# Patient Record
Sex: FEMALE | Race: OTHER | NOT HISPANIC OR LATINO | URBAN - METROPOLITAN AREA
[De-identification: names, ages, dates, MRNs, and addresses within clinical notes are randomized per-mention and may not be internally consistent; named-entity substitution may affect disease eponyms.]

---

## 2022-12-06 ENCOUNTER — EMERGENCY (EMERGENCY)
Facility: HOSPITAL | Age: 32
LOS: 1 days | Discharge: HOME | End: 2022-12-06
Attending: EMERGENCY MEDICINE | Admitting: EMERGENCY MEDICINE
Payer: MEDICAID

## 2022-12-06 VITALS — OXYGEN SATURATION: 100 % | HEIGHT: 64 IN | WEIGHT: 195.11 LBS

## 2022-12-06 PROCEDURE — 99053 MED SERV 10PM-8AM 24 HR FAC: CPT

## 2022-12-06 PROCEDURE — 99283 EMERGENCY DEPT VISIT LOW MDM: CPT | Mod: 25

## 2022-12-06 PROCEDURE — 12002 RPR S/N/AX/GEN/TRNK2.6-7.5CM: CPT

## 2022-12-06 RX ORDER — ACETAMINOPHEN 500 MG
650 TABLET ORAL ONCE
Refills: 0 | Status: COMPLETED | OUTPATIENT
Start: 2022-12-06 | End: 2022-12-06

## 2022-12-06 NOTE — ED ADULT TRIAGE NOTE - CHIEF COMPLAINT QUOTE
I got a cut on my left hand when the glass door broke as I was knocking hard on the door. OI had Tetanus shot within 5 years"

## 2022-12-06 NOTE — ED ADULT NURSE NOTE - OBJECTIVE STATEMENT
Pt is alert, came to the ER due to laceration to the left palm due to glass door that broke as she was knocking hard . Up to date with Tetanus shot.

## 2022-12-07 VITALS
RESPIRATION RATE: 19 BRPM | DIASTOLIC BLOOD PRESSURE: 71 MMHG | OXYGEN SATURATION: 100 % | SYSTOLIC BLOOD PRESSURE: 123 MMHG | HEART RATE: 89 BPM | TEMPERATURE: 98 F

## 2022-12-07 PROCEDURE — 99283 EMERGENCY DEPT VISIT LOW MDM: CPT

## 2022-12-07 PROCEDURE — 73130 X-RAY EXAM OF HAND: CPT | Mod: 26,LT

## 2022-12-07 PROCEDURE — 73130 X-RAY EXAM OF HAND: CPT

## 2022-12-07 PROCEDURE — 12002 RPR S/N/AX/GEN/TRNK2.6-7.5CM: CPT

## 2022-12-07 RX ADMIN — Medication 650 MILLIGRAM(S): at 00:16

## 2022-12-07 NOTE — ED PROVIDER NOTE - NSFOLLOWUPINSTRUCTIONS_ED_ALL_ED_FT

## 2022-12-07 NOTE — ED PROVIDER NOTE - CLINICAL SUMMARY MEDICAL DECISION MAKING FREE TEXT BOX
pt p/w cut on thumb of left hand s/o punching on glass door, no FB , repaired the wound and advise local wound care

## 2022-12-07 NOTE — ED PROVIDER NOTE - OBJECTIVE STATEMENT
32-year-old female no significant past medical history presents complaining cut on her left hand near the base of the thumb s/p she was hitting a glass on a door and she hit hard, the glass broke into pieces, complains of mild bleeding

## 2022-12-07 NOTE — ED PROVIDER NOTE - PHYSICAL EXAMINATION
General:     NAD, well-nourished, well-appearing  Head:     NC/AT, EOMI, oral mucosa moist  Neck:     supple  Lungs:     CTA b/l, no w/r/r  CVS:     S1S2, RRR, no m/g/r  Abd:     +BS, s/nt/nd, no organomegaly  Ext:    2+ radial and pedal pulses, no c/c/e  Neuro: grossly intact  skin : superficial lac on thenar eminence , distal NV intact

## 2024-09-24 ENCOUNTER — EMERGENCY (EMERGENCY)
Facility: HOSPITAL | Age: 34
LOS: 1 days | Discharge: HOME | End: 2024-09-24
Attending: STUDENT IN AN ORGANIZED HEALTH CARE EDUCATION/TRAINING PROGRAM
Payer: MEDICAID

## 2024-09-24 VITALS
OXYGEN SATURATION: 94 % | RESPIRATION RATE: 16 BRPM | SYSTOLIC BLOOD PRESSURE: 100 MMHG | HEART RATE: 84 BPM | TEMPERATURE: 99 F | HEIGHT: 64 IN | WEIGHT: 169.98 LBS | DIASTOLIC BLOOD PRESSURE: 70 MMHG

## 2024-09-24 NOTE — ED ADULT TRIAGE NOTE - CHIEF COMPLAINT QUOTE
tooth pain, foot swelling, also had sex where condom broke and had 1 episode of resolved vaginal bleeding, but noticed a genital rash

## 2024-09-25 VITALS
TEMPERATURE: 98 F | RESPIRATION RATE: 18 BRPM | OXYGEN SATURATION: 100 % | SYSTOLIC BLOOD PRESSURE: 97 MMHG | DIASTOLIC BLOOD PRESSURE: 65 MMHG | HEART RATE: 62 BPM

## 2024-09-25 LAB
ALBUMIN SERPL ELPH-MCNC: 4.6 G/DL — SIGNIFICANT CHANGE UP (ref 3.3–5)
ALP SERPL-CCNC: 65 U/L — SIGNIFICANT CHANGE UP (ref 40–120)
ALT FLD-CCNC: 13 U/L — SIGNIFICANT CHANGE UP (ref 10–45)
ANION GAP SERPL CALC-SCNC: 13 MMOL/L — SIGNIFICANT CHANGE UP (ref 5–17)
APPEARANCE UR: ABNORMAL
AST SERPL-CCNC: 16 U/L — SIGNIFICANT CHANGE UP (ref 10–40)
BACTERIA # UR AUTO: ABNORMAL /HPF
BASOPHILS # BLD AUTO: 0.03 K/UL — SIGNIFICANT CHANGE UP (ref 0–0.2)
BASOPHILS NFR BLD AUTO: 0.6 % — SIGNIFICANT CHANGE UP (ref 0–2)
BILIRUB SERPL-MCNC: 0.2 MG/DL — SIGNIFICANT CHANGE UP (ref 0.2–1.2)
BILIRUB UR-MCNC: NEGATIVE — SIGNIFICANT CHANGE UP
BUN SERPL-MCNC: 10 MG/DL — SIGNIFICANT CHANGE UP (ref 7–23)
CALCIUM SERPL-MCNC: 10.6 MG/DL — HIGH (ref 8.4–10.5)
CAST: 0 /LPF — SIGNIFICANT CHANGE UP (ref 0–4)
CHLORIDE SERPL-SCNC: 100 MMOL/L — SIGNIFICANT CHANGE UP (ref 96–108)
CO2 SERPL-SCNC: 26 MMOL/L — SIGNIFICANT CHANGE UP (ref 22–31)
COLOR SPEC: YELLOW — SIGNIFICANT CHANGE UP
CREAT SERPL-MCNC: 0.87 MG/DL — SIGNIFICANT CHANGE UP (ref 0.5–1.3)
DIFF PNL FLD: NEGATIVE — SIGNIFICANT CHANGE UP
EGFR: 89 ML/MIN/1.73M2 — SIGNIFICANT CHANGE UP
EOSINOPHIL # BLD AUTO: 0.17 K/UL — SIGNIFICANT CHANGE UP (ref 0–0.5)
EOSINOPHIL NFR BLD AUTO: 3.1 % — SIGNIFICANT CHANGE UP (ref 0–6)
GLUCOSE SERPL-MCNC: 81 MG/DL — SIGNIFICANT CHANGE UP (ref 70–99)
GLUCOSE UR QL: NEGATIVE MG/DL — SIGNIFICANT CHANGE UP
HCG SERPL-ACNC: <2 MIU/ML — SIGNIFICANT CHANGE UP
HCT VFR BLD CALC: 45.7 % — HIGH (ref 34.5–45)
HCV AB S/CO SERPL IA: 0.04 S/CO — SIGNIFICANT CHANGE UP
HCV AB SERPL-IMP: SIGNIFICANT CHANGE UP
HGB BLD-MCNC: 14.8 G/DL — SIGNIFICANT CHANGE UP (ref 11.5–15.5)
HIV 1 & 2 AB SERPL IA.RAPID: SIGNIFICANT CHANGE UP
HIV 1+2 AB+HIV1 P24 AG SERPL QL IA: SIGNIFICANT CHANGE UP
IMM GRANULOCYTES NFR BLD AUTO: 0.2 % — SIGNIFICANT CHANGE UP (ref 0–0.9)
KETONES UR-MCNC: NEGATIVE MG/DL — SIGNIFICANT CHANGE UP
LEUKOCYTE ESTERASE UR-ACNC: ABNORMAL
LYMPHOCYTES # BLD AUTO: 1.67 K/UL — SIGNIFICANT CHANGE UP (ref 1–3.3)
LYMPHOCYTES # BLD AUTO: 30.9 % — SIGNIFICANT CHANGE UP (ref 13–44)
MCHC RBC-ENTMCNC: 30.7 PG — SIGNIFICANT CHANGE UP (ref 27–34)
MCHC RBC-ENTMCNC: 32.4 GM/DL — SIGNIFICANT CHANGE UP (ref 32–36)
MCV RBC AUTO: 94.8 FL — SIGNIFICANT CHANGE UP (ref 80–100)
MONOCYTES # BLD AUTO: 0.37 K/UL — SIGNIFICANT CHANGE UP (ref 0–0.9)
MONOCYTES NFR BLD AUTO: 6.9 % — SIGNIFICANT CHANGE UP (ref 2–14)
NEUTROPHILS # BLD AUTO: 3.15 K/UL — SIGNIFICANT CHANGE UP (ref 1.8–7.4)
NEUTROPHILS NFR BLD AUTO: 58.3 % — SIGNIFICANT CHANGE UP (ref 43–77)
NITRITE UR-MCNC: POSITIVE
NRBC # BLD: 0 /100 WBCS — SIGNIFICANT CHANGE UP (ref 0–0)
PH UR: 5.5 — SIGNIFICANT CHANGE UP (ref 5–8)
PLATELET # BLD AUTO: 231 K/UL — SIGNIFICANT CHANGE UP (ref 150–400)
POTASSIUM SERPL-MCNC: 3.9 MMOL/L — SIGNIFICANT CHANGE UP (ref 3.5–5.3)
POTASSIUM SERPL-SCNC: 3.9 MMOL/L — SIGNIFICANT CHANGE UP (ref 3.5–5.3)
PROT SERPL-MCNC: 7.8 G/DL — SIGNIFICANT CHANGE UP (ref 6–8.3)
PROT UR-MCNC: NEGATIVE MG/DL — SIGNIFICANT CHANGE UP
RBC # BLD: 4.82 M/UL — SIGNIFICANT CHANGE UP (ref 3.8–5.2)
RBC # FLD: 11.4 % — SIGNIFICANT CHANGE UP (ref 10.3–14.5)
RBC CASTS # UR COMP ASSIST: 0 /HPF — SIGNIFICANT CHANGE UP (ref 0–4)
REVIEW: SIGNIFICANT CHANGE UP
SODIUM SERPL-SCNC: 139 MMOL/L — SIGNIFICANT CHANGE UP (ref 135–145)
SP GR SPEC: 1.01 — SIGNIFICANT CHANGE UP (ref 1–1.03)
SQUAMOUS # UR AUTO: 4 /HPF — SIGNIFICANT CHANGE UP (ref 0–5)
UROBILINOGEN FLD QL: 1 MG/DL — SIGNIFICANT CHANGE UP (ref 0.2–1)
WBC # BLD: 5.4 K/UL — SIGNIFICANT CHANGE UP (ref 3.8–10.5)
WBC # FLD AUTO: 5.4 K/UL — SIGNIFICANT CHANGE UP (ref 3.8–10.5)
WBC UR QL: 33 /HPF — HIGH (ref 0–5)

## 2024-09-25 RX ORDER — ULIPRISTAL ACETATE 30 MG/1
30 TABLET ORAL ONCE
Refills: 0 | Status: COMPLETED | OUTPATIENT
Start: 2024-09-25 | End: 2024-09-25

## 2024-09-25 RX ORDER — LEVONORGESTREL 1.5 MG/1
1.5 TABLET ORAL ONCE
Refills: 0 | Status: DISCONTINUED | OUTPATIENT
Start: 2024-09-25 | End: 2024-09-25

## 2024-09-25 RX ORDER — NITROFURANTOIN MONOHYD/M-CRYST 100 MG
1 CAPSULE ORAL
Qty: 14 | Refills: 0
Start: 2024-09-25 | End: 2024-10-01

## 2024-09-25 RX ORDER — NITROFURANTOIN MONOHYD/M-CRYST 100 MG
1 CAPSULE ORAL
Qty: 14 | Refills: 0
Start: 2024-09-25 | End: 2024-10-02

## 2024-09-25 RX ORDER — CLINDAMYCIN PHOSPHATE 20 MG/G
1 CREAM VAGINAL
Qty: 1 | Refills: 1
Start: 2024-09-25

## 2024-09-25 RX ADMIN — Medication 100 MILLIGRAM(S): at 04:51

## 2024-09-25 RX ADMIN — ULIPRISTAL ACETATE 30 MILLIGRAM(S): 30 TABLET ORAL at 05:29

## 2024-09-25 NOTE — ED PROVIDER NOTE - IV ALTEPLASE EXCL ABS HIDDEN
Patient states that Dr Kuldeep Silva is her OB/GYN and requested that patient have Dr Young Alanis lower the dosage of the Levothyroxine from 175 mcg tablet to 150 mcg tablet with a 90 day supply  Patient is pregnant  Please advise patient when order is sent in to the pharmacy  Please review chart for results of lab work  show

## 2024-09-25 NOTE — ED PROVIDER NOTE - PHYSICAL EXAMINATION
GENERAL: Awake, alert, NAD  HEAD: NC/AT, neck supple, moist mucous membranes, poor dentition with multiple caries noted; broken tooth on left lower jaw with exposed nerve root, no obvious abscess   EYES: PERRL, EOM grossly intact, sclera anicteric  LUNGS: normal WOB on RA, CTAB, no wheezes or crackles   CARDIAC: RRR, no m/r/g  ABDOMEN: Soft, mildly tender over bilateral lower quadrants and suprapubic region, non distended, no rebound, no guarding  BACK: no CVA tenderness  EXT: No edema, no calf tenderness, no deformities.  NEURO: A&Ox3. Moving all extremities without focal deficit.  SKIN: Warm and dry. No rash. Note scattered bumps/irritation c/w acne to bilateral glutes  PSYCH: Normal affect.

## 2024-09-25 NOTE — ED ADULT NURSE NOTE - OBJECTIVE STATEMENT
Break coverage RN. PT is a 34yo F coming from home c/o multiple medical complaints. As per pt, had unprotected vaginal intercourse last night and now would like a plan B, also had a polyp removed in the past and has been having vaginal bleeding and spotting over the past couple of days. PT also states that she used to be on acne medication for a rash on her buttocks, but is now unable to afford medicine. PT also endorses wanted STD/STI testing and had an abscess over tooth that recently burst, but needs abx. No pertinent PMH. PT A,Ox4, ambulatory at baseline. Respirations even and unlabored, abd soft, nondistended and nontender, skin warm, dry and intact, CRUZ. Denies HA, CP, SOB, n/v/d, chills. Stretcher locked in lowest position, appropriate side rails up for safety, pt instructed to call for RN if anything needed.

## 2024-09-25 NOTE — ED PROVIDER NOTE - NSFOLLOWUPCLINICS_GEN_ALL_ED_FT
[facility]  OB/GYN & Surg  [address] [place] [address]  Phone: [phone]  Fax:     [facility] - [place]  Dermatology  [address], [address]  [place] [address]  Phone: [phone]  Fax: [phone]

## 2024-09-25 NOTE — ED PROVIDER NOTE - ATTENDING CONTRIBUTION TO CARE
33 female here with multiple medical complaints of abscess that she popped by left lower tooth that broke, cloudy urine with dysuria and right gluteal acne.  Never had STI in the past.  LMP first week of September.  Denies nausea, vomiting.  Denies chest pain, shortness of breath.  Had fever yesterday Tmax 100.1.  Took Motrin with improvement.  History complicated by Suboxone use.  Patient states she has not had sexual intercourse in "hot" but triage note states that she had recent sexual intercourse with a broken condom.    Hemodynamically stable. NAD. AAOx4 (person, place, time, event). PERRL 3mm, EOMI w/o nystagmus, well hydrated, poor dentition with fillings, missing teeth in the left lower molar, broken tooth with exposed nerve root left lower quadrant of mouth without gum redness, no fluctuant region visible.  No neck crepitations.  RRR, no audible cardiac murmurs. clear lungs, no inc work of breathing. benign abdomen except for mild suprapubic and left lower quadrant abdominal tenderness palpation., - lange, no peritoneal signs, no cva ttp. no visible rashes nor deformities, no signs of jaundice, fluid overload, nor anemia. symm calves, no edema. full str/rom/neurovasc all 4 extrem. Steady gait.    History exam without evidence of deep space oropharyngeal infection.  Patient with fluent speech.  Has been tolerating soup.  Do not suspect cervicitis as patient without foul vaginal discharge.  Will evaluate for urinary tract infection.  With blood streak vaginal discharge, patient states she had a polyp in the past that needed surgical removal by OB/GYN.  Will evaluate further with transvaginal ultrasound.  Patient accepts STI testing.  Obtain labs.  Anticipate prescribing clindamycin gel for right gluteal acne/folliculitis.

## 2024-09-25 NOTE — ED PROVIDER NOTE - PATIENT PORTAL LINK FT
You can access the FollowMyHealth Patient Portal offered by [facility] by registering at the following website: [email or web address]. By joining [employer]’s FollowMyHealth portal, you will also be able to view your health information using other applications (apps) compatible with our system.

## 2024-09-25 NOTE — ED PROVIDER NOTE - CARE PLAN
1 Principal Discharge DX:	Pain, dental  Secondary Diagnosis:	Encounter for medical screening examination

## 2024-09-25 NOTE — ED PROVIDER NOTE - OBJECTIVE STATEMENT
33Y F here with multiple complaints including dental pain and vaginal bleeding. Patient reports that she broke a tooth several days ago and is experiencing pain to a tooth on L lower side of mouth. 33Y F here with multiple complaints including dental pain and vaginal bleeding. Patient reports that she broke a tooth several days ago and is experiencing pain to a tooth on L lower side of mouth, concerned she may have an abscess. Also c/o vaginal spotting for last several days, notes that her period is not due for another week (reports LMP 9/1). Denies vaginal discharge. States that she has not been sexually active for a while but is requesting STI testing today. Feels that her urine has been cloudy and is concerned for UTI, would like to be checked for this as well. Patient also c/o acne on her bottom and wonders if we can give her cream for this. Had a fever to 100.1F last night, took motrin with some relief.

## 2024-09-25 NOTE — ED PROVIDER NOTE - PROGRESS NOTE DETAILS
Attending Beni Hatfield:  Patient stated to nurse she was sexually active, insurance does not go into affect yet. Asking for plan B. Jeaneth Pitt MD PGY-3: US with 2cm hemorrhagic ovarian cyst. UA with evidence of UTI, ceftriaxone given. labs nonactionable, HIV and Hep C screening negative. will dc with Macrobid for UTI. patient has dental f/u appt today that she plans to keep. will give GYN referral, PMD referral for outpatient follow up. patient expressed understanding of return precautions and f/u plan and will be dc home in stable condition.

## 2024-09-25 NOTE — ED PROVIDER NOTE - NSFOLLOWUPINSTRUCTIONS_ED_ALL_ED_FT
You were seen in the ER today for dental pain and vaginal bleeding.    Your urine testing showed evidence of a urinary tract infection. You were treated with an antibiotic called ceftriaxone through the IV. I prescribed an antibiotic called nitrofurantoin (Macrobid) which should be taken twice per day for the next 7 days.    Your ultrasound showed a cyst on your ovary. You should follow up with an OBGYN doctor for repeat imaging and further evaluation of this cyst. I placed a referral; someone will call you to help you schedule an appointment.     The results of all of the testing that was performed today are included in this paperwork.     Return to the ER if you develop new or worsening pain, fevers, dizziness or lightheadedness, heavy vaginal bleeding (soaking through >1 pad per hour) or if you are otherwise concerned, return to the ER to be re-evaluated.

## 2024-09-26 LAB
C TRACH RRNA SPEC QL NAA+PROBE: DETECTED
N GONORRHOEA RRNA SPEC QL NAA+PROBE: SIGNIFICANT CHANGE UP

## 2024-09-26 NOTE — ED POST DISCHARGE NOTE - ADDITIONAL DOCUMENTATION
Postpartum Note:    Patient without complaints, ambulating and voiding without difficulty  Vitals with min/max:      Vital Last Value 24 Hour Range   Temperature 98.3 °F (36.8 °C) (10/07/20 0740) Temp  Min: 98.1 °F (36.7 °C)  Max: 98.8 °F (37.1 °C)   Pulse 60 (10/07/20 0740) Pulse  Min: 59  Max: 86   Respiratory 16 (10/07/20 0740) Resp  Min: 16  Max: 16   Non-Invasive  Blood Pressure 100/80 (10/07/20 0740) BP  Min: 100/80  Max: 121/66   Pulse Oximetry   No data recorded   Arterial   Blood Pressure   No data recorded      Lochia as expected,     Heart-regular rate & rhythm     Lungs-CTA bilaterally    Abdomen-soft, nondistended, +bowel sounds, normoactive    Extremeties-no clubbing cyanosis or edema.     WBC (K/mcL)   Date Value   10/07/2020 11.2 (H)     RBC (mil/mcL)   Date Value   10/07/2020 3.85 (L)     HCT (%)   Date Value   10/07/2020 34.2 (L)     HGB (g/dL)   Date Value   10/07/2020 10.9 (L)     PLT (K/mcL)   Date Value   10/07/2020 203      Progressing as expected S/P       Postpartum Day #  1-S/P   Continue Routine Post-Partum Care and Teaching  Routine Follow-Up  D/C TO HOME TOMORROW   9/27/24 Faraz NUNEZ pt called stating pharmacy did not receive Doxycycline. rx resubmitted to pharmacy.

## 2024-09-27 RX ORDER — DOXYCYCLINE MONOHYDRATE 100 MG
1 TABLET ORAL
Qty: 14 | Refills: 0
Start: 2024-09-27 | End: 2024-10-03

## 2024-09-28 LAB
-  AMOXICILLIN/CLAVULANIC ACID: SIGNIFICANT CHANGE UP
-  AMPICILLIN/SULBACTAM: SIGNIFICANT CHANGE UP
-  AMPICILLIN: SIGNIFICANT CHANGE UP
-  AZTREONAM: SIGNIFICANT CHANGE UP
-  CEFAZOLIN: SIGNIFICANT CHANGE UP
-  CEFEPIME: SIGNIFICANT CHANGE UP
-  CEFOXITIN: SIGNIFICANT CHANGE UP
-  CEFTRIAXONE: SIGNIFICANT CHANGE UP
-  CEFUROXIME: SIGNIFICANT CHANGE UP
-  CIPROFLOXACIN: SIGNIFICANT CHANGE UP
-  ERTAPENEM: SIGNIFICANT CHANGE UP
-  GENTAMICIN: SIGNIFICANT CHANGE UP
-  IMIPENEM: SIGNIFICANT CHANGE UP
-  LEVOFLOXACIN: SIGNIFICANT CHANGE UP
-  MEROPENEM: SIGNIFICANT CHANGE UP
-  NITROFURANTOIN: SIGNIFICANT CHANGE UP
-  PIPERACILLIN/TAZOBACTAM: SIGNIFICANT CHANGE UP
-  TOBRAMYCIN: SIGNIFICANT CHANGE UP
-  TRIMETHOPRIM/SULFAMETHOXAZOLE: SIGNIFICANT CHANGE UP
CULTURE RESULTS: ABNORMAL
METHOD TYPE: SIGNIFICANT CHANGE UP
ORGANISM # SPEC MICROSCOPIC CNT: ABNORMAL
ORGANISM # SPEC MICROSCOPIC CNT: ABNORMAL
SPECIMEN SOURCE: SIGNIFICANT CHANGE UP

## 2024-09-30 ENCOUNTER — EMERGENCY (EMERGENCY)
Facility: HOSPITAL | Age: 34
LOS: 1 days | Discharge: HOME | End: 2024-09-30
Attending: EMERGENCY MEDICINE
Payer: MEDICAID

## 2024-09-30 VITALS
SYSTOLIC BLOOD PRESSURE: 101 MMHG | RESPIRATION RATE: 20 BRPM | DIASTOLIC BLOOD PRESSURE: 84 MMHG | WEIGHT: 175.05 LBS | TEMPERATURE: 99 F | HEART RATE: 89 BPM | HEIGHT: 64 IN | OXYGEN SATURATION: 95 %

## 2024-09-30 VITALS
HEART RATE: 71 BPM | RESPIRATION RATE: 16 BRPM | SYSTOLIC BLOOD PRESSURE: 111 MMHG | TEMPERATURE: 99 F | DIASTOLIC BLOOD PRESSURE: 77 MMHG | OXYGEN SATURATION: 100 %

## 2024-09-30 PROBLEM — Z78.9 OTHER SPECIFIED HEALTH STATUS: Chronic | Status: ACTIVE | Noted: 2024-09-25

## 2024-09-30 LAB
ADD ON TEST-SPECIMEN IN LAB: SIGNIFICANT CHANGE UP
ALBUMIN SERPL ELPH-MCNC: 4.6 G/DL — SIGNIFICANT CHANGE UP (ref 3.3–5)
ALP SERPL-CCNC: 65 U/L — SIGNIFICANT CHANGE UP (ref 40–120)
ALT FLD-CCNC: 16 U/L — SIGNIFICANT CHANGE UP (ref 10–45)
ANION GAP SERPL CALC-SCNC: 14 MMOL/L — SIGNIFICANT CHANGE UP (ref 5–17)
APPEARANCE UR: CLEAR — SIGNIFICANT CHANGE UP
AST SERPL-CCNC: 17 U/L — SIGNIFICANT CHANGE UP (ref 10–40)
BACTERIA # UR AUTO: ABNORMAL /HPF
BASOPHILS # BLD AUTO: 0.04 K/UL — SIGNIFICANT CHANGE UP (ref 0–0.2)
BASOPHILS NFR BLD AUTO: 0.5 % — SIGNIFICANT CHANGE UP (ref 0–2)
BILIRUB SERPL-MCNC: 0.5 MG/DL — SIGNIFICANT CHANGE UP (ref 0.2–1.2)
BILIRUB UR-MCNC: NEGATIVE — SIGNIFICANT CHANGE UP
BUN SERPL-MCNC: 12 MG/DL — SIGNIFICANT CHANGE UP (ref 7–23)
CALCIUM SERPL-MCNC: 9.8 MG/DL — SIGNIFICANT CHANGE UP (ref 8.4–10.5)
CAST: 0 /LPF — SIGNIFICANT CHANGE UP (ref 0–4)
CHLORIDE SERPL-SCNC: 103 MMOL/L — SIGNIFICANT CHANGE UP (ref 96–108)
CO2 SERPL-SCNC: 23 MMOL/L — SIGNIFICANT CHANGE UP (ref 22–31)
COLOR SPEC: YELLOW — SIGNIFICANT CHANGE UP
CREAT SERPL-MCNC: 0.81 MG/DL — SIGNIFICANT CHANGE UP (ref 0.5–1.3)
CRP SERPL-MCNC: 3 MG/L — SIGNIFICANT CHANGE UP (ref 0–4)
DIFF PNL FLD: NEGATIVE — SIGNIFICANT CHANGE UP
EGFR: 97 ML/MIN/1.73M2 — SIGNIFICANT CHANGE UP
EOSINOPHIL # BLD AUTO: 0.09 K/UL — SIGNIFICANT CHANGE UP (ref 0–0.5)
EOSINOPHIL NFR BLD AUTO: 1.1 % — SIGNIFICANT CHANGE UP (ref 0–6)
ERYTHROCYTE [SEDIMENTATION RATE] IN BLOOD: 34 MM/HR — HIGH (ref 0–15)
GLUCOSE SERPL-MCNC: 109 MG/DL — HIGH (ref 70–99)
GLUCOSE UR QL: NEGATIVE MG/DL — SIGNIFICANT CHANGE UP
HCG UR QL: NEGATIVE — SIGNIFICANT CHANGE UP
HCT VFR BLD CALC: 41.4 % — SIGNIFICANT CHANGE UP (ref 34.5–45)
HGB BLD-MCNC: 14.1 G/DL — SIGNIFICANT CHANGE UP (ref 11.5–15.5)
IMM GRANULOCYTES NFR BLD AUTO: 0.2 % — SIGNIFICANT CHANGE UP (ref 0–0.9)
KETONES UR-MCNC: 40 MG/DL
LEUKOCYTE ESTERASE UR-ACNC: NEGATIVE — SIGNIFICANT CHANGE UP
LYMPHOCYTES # BLD AUTO: 1.41 K/UL — SIGNIFICANT CHANGE UP (ref 1–3.3)
LYMPHOCYTES # BLD AUTO: 17.4 % — SIGNIFICANT CHANGE UP (ref 13–44)
MCHC RBC-ENTMCNC: 30.9 PG — SIGNIFICANT CHANGE UP (ref 27–34)
MCHC RBC-ENTMCNC: 34.1 GM/DL — SIGNIFICANT CHANGE UP (ref 32–36)
MCV RBC AUTO: 90.6 FL — SIGNIFICANT CHANGE UP (ref 80–100)
MONOCYTES # BLD AUTO: 0.38 K/UL — SIGNIFICANT CHANGE UP (ref 0–0.9)
MONOCYTES NFR BLD AUTO: 4.7 % — SIGNIFICANT CHANGE UP (ref 2–14)
NEUTROPHILS # BLD AUTO: 6.15 K/UL — SIGNIFICANT CHANGE UP (ref 1.8–7.4)
NEUTROPHILS NFR BLD AUTO: 76.1 % — SIGNIFICANT CHANGE UP (ref 43–77)
NITRITE UR-MCNC: NEGATIVE — SIGNIFICANT CHANGE UP
NRBC # BLD: 0 /100 WBCS — SIGNIFICANT CHANGE UP (ref 0–0)
PH UR: 8.5 (ref 5–8)
PLATELET # BLD AUTO: 210 K/UL — SIGNIFICANT CHANGE UP (ref 150–400)
POTASSIUM SERPL-MCNC: 3.4 MMOL/L — LOW (ref 3.5–5.3)
POTASSIUM SERPL-SCNC: 3.4 MMOL/L — LOW (ref 3.5–5.3)
PROT SERPL-MCNC: 7.9 G/DL — SIGNIFICANT CHANGE UP (ref 6–8.3)
PROT UR-MCNC: SIGNIFICANT CHANGE UP MG/DL
RBC # BLD: 4.57 M/UL — SIGNIFICANT CHANGE UP (ref 3.8–5.2)
RBC # FLD: 11.3 % — SIGNIFICANT CHANGE UP (ref 10.3–14.5)
RBC CASTS # UR COMP ASSIST: 2 /HPF — SIGNIFICANT CHANGE UP (ref 0–4)
SODIUM SERPL-SCNC: 140 MMOL/L — SIGNIFICANT CHANGE UP (ref 135–145)
SP GR SPEC: >1.03 — HIGH (ref 1–1.03)
SQUAMOUS # UR AUTO: 7 /HPF — HIGH (ref 0–5)
UROBILINOGEN FLD QL: 1 MG/DL — SIGNIFICANT CHANGE UP (ref 0.2–1)
WBC # BLD: 8.09 K/UL — SIGNIFICANT CHANGE UP (ref 3.8–10.5)
WBC # FLD AUTO: 8.09 K/UL — SIGNIFICANT CHANGE UP (ref 3.8–10.5)
WBC UR QL: 7 /HPF — HIGH (ref 0–5)

## 2024-09-30 PROCEDURE — 76830 TRANSVAGINAL US NON-OB: CPT

## 2024-09-30 PROCEDURE — 87086 URINE CULTURE/COLONY COUNT: CPT

## 2024-09-30 PROCEDURE — 84100 ASSAY OF PHOSPHORUS: CPT

## 2024-09-30 PROCEDURE — 80053 COMPREHEN METABOLIC PANEL: CPT

## 2024-09-30 PROCEDURE — 85025 COMPLETE CBC W/AUTO DIFF WBC: CPT

## 2024-09-30 PROCEDURE — 76830 TRANSVAGINAL US NON-OB: CPT | Mod: 26

## 2024-09-30 PROCEDURE — 96375 TX/PRO/DX INJ NEW DRUG ADDON: CPT

## 2024-09-30 PROCEDURE — 86140 C-REACTIVE PROTEIN: CPT

## 2024-09-30 PROCEDURE — 96374 THER/PROPH/DIAG INJ IV PUSH: CPT

## 2024-09-30 PROCEDURE — 99285 EMERGENCY DEPT VISIT HI MDM: CPT | Mod: 25

## 2024-09-30 PROCEDURE — 84702 CHORIONIC GONADOTROPIN TEST: CPT

## 2024-09-30 PROCEDURE — 93005 ELECTROCARDIOGRAM TRACING: CPT

## 2024-09-30 PROCEDURE — 85652 RBC SED RATE AUTOMATED: CPT

## 2024-09-30 PROCEDURE — 99285 EMERGENCY DEPT VISIT HI MDM: CPT

## 2024-09-30 PROCEDURE — 96376 TX/PRO/DX INJ SAME DRUG ADON: CPT

## 2024-09-30 PROCEDURE — 81001 URINALYSIS AUTO W/SCOPE: CPT

## 2024-09-30 PROCEDURE — 81025 URINE PREGNANCY TEST: CPT

## 2024-09-30 PROCEDURE — 83735 ASSAY OF MAGNESIUM: CPT

## 2024-09-30 RX ORDER — ONDANSETRON 2 MG/ML
4 INJECTION, SOLUTION INTRAMUSCULAR; INTRAVENOUS ONCE
Refills: 0 | Status: COMPLETED | OUTPATIENT
Start: 2024-09-30 | End: 2024-09-30

## 2024-09-30 RX ORDER — AZITHROMYCIN 500 MG/1
1000 TABLET, FILM COATED ORAL ONCE
Refills: 0 | Status: COMPLETED | OUTPATIENT
Start: 2024-09-30 | End: 2024-09-30

## 2024-09-30 RX ORDER — SODIUM CHLORIDE 9 MG/ML
500 INJECTION INTRAMUSCULAR; INTRAVENOUS; SUBCUTANEOUS ONCE
Refills: 0 | Status: COMPLETED | OUTPATIENT
Start: 2024-09-30 | End: 2024-09-30

## 2024-09-30 RX ORDER — ONDANSETRON 2 MG/ML
1 INJECTION, SOLUTION INTRAMUSCULAR; INTRAVENOUS
Qty: 1 | Refills: 0
Start: 2024-09-30 | End: 2024-10-02

## 2024-09-30 RX ORDER — POTASSIUM CHLORIDE 10 MEQ
40 TABLET, EXT RELEASE, PARTICLES/CRYSTALS ORAL ONCE
Refills: 0 | Status: COMPLETED | OUTPATIENT
Start: 2024-09-30 | End: 2024-09-30

## 2024-09-30 RX ORDER — ACETAMINOPHEN 325 MG/1
1000 TABLET ORAL ONCE
Refills: 0 | Status: COMPLETED | OUTPATIENT
Start: 2024-09-30 | End: 2024-09-30

## 2024-09-30 RX ORDER — CEFPODOXIME PROXETIL 100 MG/5ML
1 GRANULE, FOR SUSPENSION ORAL
Qty: 28 | Refills: 0
Start: 2024-09-30 | End: 2024-10-13

## 2024-09-30 RX ORDER — AZITHROMYCIN 500 MG/1
1 TABLET, FILM COATED ORAL ONCE
Refills: 0 | Status: DISCONTINUED | OUTPATIENT
Start: 2024-09-30 | End: 2024-09-30

## 2024-09-30 RX ORDER — SODIUM CHLORIDE 9 MG/ML
1000 INJECTION INTRAMUSCULAR; INTRAVENOUS; SUBCUTANEOUS ONCE
Refills: 0 | Status: COMPLETED | OUTPATIENT
Start: 2024-09-30 | End: 2024-09-30

## 2024-09-30 RX ADMIN — Medication 40 MILLIEQUIVALENT(S): at 16:31

## 2024-09-30 RX ADMIN — AZITHROMYCIN 1000 MILLIGRAM(S): 500 TABLET, FILM COATED ORAL at 16:30

## 2024-09-30 RX ADMIN — SODIUM CHLORIDE 1000 MILLILITER(S): 9 INJECTION INTRAMUSCULAR; INTRAVENOUS; SUBCUTANEOUS at 13:05

## 2024-09-30 RX ADMIN — SODIUM CHLORIDE 500 MILLILITER(S): 9 INJECTION INTRAMUSCULAR; INTRAVENOUS; SUBCUTANEOUS at 17:21

## 2024-09-30 RX ADMIN — ONDANSETRON 4 MILLIGRAM(S): 2 INJECTION, SOLUTION INTRAMUSCULAR; INTRAVENOUS at 17:21

## 2024-09-30 RX ADMIN — ONDANSETRON 4 MILLIGRAM(S): 2 INJECTION, SOLUTION INTRAMUSCULAR; INTRAVENOUS at 13:04

## 2024-09-30 RX ADMIN — Medication 100 MILLIGRAM(S): at 16:07

## 2024-09-30 RX ADMIN — ACETAMINOPHEN 400 MILLIGRAM(S): 325 TABLET ORAL at 13:05

## 2024-09-30 NOTE — ED ADULT NURSE NOTE - NSFALLUNIVINTERV_ED_ALL_ED
Bed/Stretcher in lowest position, wheels locked, appropriate side rails in place/Call bell, personal items and telephone in reach/Instruct patient to call for assistance before getting out of bed/chair/stretcher/Non-slip footwear applied when patient is off stretcher/[place] to call system/Physically safe environment - no spills, clutter or unnecessary equipment/Purposeful proactive rounding/Room/bathroom lighting operational, light cord in reach

## 2024-09-30 NOTE — ED PROVIDER NOTE - PATIENT PORTAL LINK FT
You can access the FollowMyHealth Patient Portal offered by [facility] by registering at the following website: [email or web address]. By joining [employer]’s FollowMyHealth portal, you will also be able to view your health information using other applications (apps) compatible with our system.

## 2024-09-30 NOTE — CONSULT NOTE ADULT - SUBJECTIVE AND OBJECTIVE BOX
FABIEN LOZA  33y  Female [id number]    HPI:  34yo  LMP  with a PMH of PTSD, anxiety on Suboxone presenting with nausea and vomiting x2-3days. Pt was last seen in the ED on  for multiple medical complaints and found to have a UTI, + chlamydia as well as an incidental 2cm R ovarian hemorrhagic cyst Pt was sent home on macrobid and Doxycycline. Pt reports she started taking both medications on Friday but had also stopped taking her Suboxone at the time. She had progressively worsening n/v since Saturday and has been unable to tolerate PO. When seen in the ED, pt reports resolution of symptoms with zofran and she had been able to tolerate some sandwich and gingerale. She denies any fevers, chills, SOB, CP, dizziness, or abdominal pain.       Name of GYN Physician: default    POB:  NSVDx2, spontaneous abx2  Pgyn: Has hx of ovarian cyst but denies fibroids, endometriosis, STI's, Abnormal pap smears   PMHX: PTSD, Anxiety, panic attacks  PSHx: left labial cyst excision  Meds: Venlafaxine, Macrobid, Doxycycline, Suboxone Clindamycin cream  All: NKDA      Vital Signs Last 24 Hrs  T(C): 37.1 (30 Sep 2024 12:01), Max: 37.1 (30 Sep 2024 12:)  T(F): 98.7 (30 Sep 2024 12:01), Max: 98.7 (30 Sep 2024 12:01)  HR: 89 (30 Sep 2024 12:) (89 - 89)  BP: 101/84 (30 Sep 2024 12:01) (101/84 - 101/84)  BP(mean): --  RR: 20 (30 Sep 2024 12:01) (20 - 20)  SpO2: 95% (30 Sep 2024 12:) (95% - 95%)    Parameters below as of 30 Sep 2024 12:01  Patient On (Oxygen Delivery Method): room air        Physical Exam:   General: sitting comfortably in bed, NAD   CV: well perfused  Lungs: saturating well on RA  Abd: Soft, non-tender, non-distended.     :  No bleeding on pad.  Bimanual exam with no cervical motion tenderness, uterus wnl, adnexa non palpable  and nontender bilaterally.  Speculum Exam: No active bleeding from os.  Physiologic discharge.    Ext: non-tender b/l, no edema     LABS:                              14.1   8.09  )-----------( 210      ( 30 Sep 2024 13:20 )             41.4         140  |  103  |  12  ----------------------------<  109[H]  3.4[L]   |  23  |  0.81    Ca    9.8      30 Sep 2024 13:20  Phos  3.1       Mg     1.9         TPro  7.9  /  Alb  4.6  /  TBili  0.5  /  DBili  x   /  AST  17  /  ALT  16  /  AlkPhos  65      I&O's Detail      Urinalysis Basic - ( 30 Sep 2024 16:27 )    Color: Yellow / Appearance: Clear / SG: >1.030 / pH: x  Gluc: x / Ketone: 40 mg/dL  / Bili: Negative / Urobili: 1.0 mg/dL   Blood: x / Protein: Trace mg/dL / Nitrite: Negative   Leuk Esterase: Negative / RBC: 2 /HPF / WBC 7 /HPF   Sq Epi: x / Non Sq Epi: 7 /HPF / Bacteria: Occasional /HPF        RADIOLOGY & ADDITIONAL STUDIES:    < from: US Transvaginal (24 @ 14:54) >  PROCEDURE DATE:  2024          INTERPRETATION:  CLINICAL INFORMATION: 33-year-old female with fever and   vomiting, for evaluation of tubo-ovarian abscess.    LMP: 2024    COMPARISON: Pelvic ultrasound 2024    TECHNIQUE:  Endovaginal and transabdominal pelvic sonogram. Color and Spectral   Doppler was performed.    FINDINGS:  Uterus: 7.3 cm x 4.5 cm x 5.7 cm. The uterus is retroverted. No fibroids   are identified. There are several small myometrial cysts.    Endometrium: 8.0 mm. No focal finding.    Right ovary: 3.9 cm x 2.4 cm x 2.8 cm. There is a complex cyst with   low-level internal echoes and linear echogenic reflectors with a   reticular appearance measuring 2.2 x 1.8 x 2.1 cm. There is no internal   vascularity demonstrated within this cyst with color Doppler. There is a   thickened, irregular in contour wall with peripheral flow. These findings   are compatible with a hemorrhagic cyst/corpus luteum. On the prior   ultrasound, this cyst measured 2.2 x 2.1 x 1.9 cm. This is not   substantially changed in overall size, however, there are increased   internal echoes within this cyst suggesting interval hemorrhage within   the cyst. Normalarterial and venous waveforms are demonstrated within   the right ovary.  Left ovary: 2.5 cm x 1.7 cm x 1.9 cm. Within normal limits, containing   small follicles.. Normal arterial and venous waveforms.    Fluid: Small amount of complex fluid with low-level echoes within the   cul-de-sac and right adnexa. This appears to be slightly increased in   volume and complexity compared with prior exam. There is no free fluid   seen within the upper quadrants.    Incidentally noted are gallstones within the gallbladder. There is also a   1.2 x 1.2 cm well-circumscribed echogenic lesion within the liver which   may represent a benign hemangioma but which is not definitively   characterized on this exam.    IMPRESSION:  1. There is a complex right ovarian cyst measuring 2.2 x 1.8 x 2.1 cm   compatible with a hemorrhagic cyst or corpus luteum. This is not   substantially changed in size compared to the prior ultrasound but is   more complex in appearance with increased  internal echoes. There is a   small amount of complex fluid present within the pelvis slightly   increased in volume and complexity compared to prior ultrasound which may   rerpesent small volume hemoperitoneum in the setting of partial cyst   rupture. Suggest clinical correlation. No free fluid is seen within the   upper abdomen.  2. The left ovary is unremarkable, containing small follicles.  3. No evidence of tubo-ovarian abscess.  4. Incidentally noted are gallstones within the gallbladder and a 1.2 cm   well-circumscribedechogenic lesion within the liver. This may represent   a small benign hemangioma. For definitive characterization, nonemergent   liver protocol abdominal MRI is recommended.    Findings were discussed with Dr. CHRISTOPHER EVERETT [id number] 2024  4:08 PM by Dr. Elmore with read back confirmation.    --- End of Report ---    < end of copied text >

## 2024-09-30 NOTE — ED PROVIDER NOTE - PHYSICAL EXAMINATION
GENERAL: Awake, alert, NAD, non-toxic appearing  HEAD: NC/AT, neck supple, moist mucous membranes  EYES: PERRL, EOM grossly intact, sclera anicteric  LUNGS: normal WOB on RA, CTAB, no wheezes or crackles   CARDIAC: RRR, no m/r/g  ABDOMEN: Soft, suprapubic TTP  BACK: No midline spinal tenderness, R CVA TTP  EXT: No edema, no deformities.  NEURO: A&Ox3. Moving all extremities.  SKIN: Warm and dry. No rash.  PSYCH: Normal affect.

## 2024-09-30 NOTE — ED PROVIDER NOTE - OBJECTIVE STATEMENT
33-year-old female with history of PTSD, anxiety, OUD on Suboxone 12 mg daily presenting for evaluation of nausea and vomiting since last night.  Patient has had approximately 10 episodes of nonbloody vomiting that is now bilious as she has been unable to keep anything down for about 24 hours.  She was seen in the emergency department on 9/26 diagnosed with a UTI and chlamydia infection.  She got a dose of ceftriaxone in the emergency department and was sent home on doxycycline and nitrofurantoin.  She did not start taking these until 9/28.  When she started taking these medications she also stopped taking her Suboxone.  Her last dose was 8 mg Saturday morning.  She started developing significant vomiting last night along with a temperature of 100.3.  She endorses low abdominal pain, nonbloody diarrhea, rhinorrhea.  Denies chest pain, shortness of breath, urinary symptoms, vaginal bleeding.

## 2024-09-30 NOTE — ED PROVIDER NOTE - SHIFT CHANGE DETAILS
Attending MD Price: 33F seen with UTI, chlamydia 5d ago, dc'ed on Macrobid, Rx'ed Doxy for chlamydia, vomited after taking both on Sat AM, on Suboxone, here with pelvic pain, suprapubic and R flank, clinical pyelo, here given IV Ceftriaxone, ordered for Azithro 1gm given intolerance of Doxy, pending Gyn ?PID, pending reassessment admit vs dc (if feels improved and no contraindication from Gyn)

## 2024-09-30 NOTE — ED PROVIDER NOTE - NS ED MD DISPO DISCHARGE CCDA
Test Reason : 

Blood Pressure : ***/*** mmHG

Vent. Rate : 105 BPM     Atrial Rate : 105 BPM

   P-R Int : 182 ms          QRS Dur : 078 ms

    QT Int : 360 ms       P-R-T Axes : 059 -16 027 degrees

   QTc Int : 475 ms

 

Sinus tachycardia

Possible Left atrial enlargement

Left ventricular hypertrophy

Abnormal ECG

 

Confirmed by AUDRA COSTA DO ([id number]),  JERED WALLACE ([id number]) on 8/6/2019 1:29:39 PM

 

Referred By:             Confirmed By:AUDRA COSTA DO Patient/Caregiver provided printed discharge information.

## 2024-09-30 NOTE — ED ADULT NURSE NOTE - OBJECTIVE STATEMENT
32 y/o F A&Ox3 denies PMH/PSH presents to the Ed from home c/o vomiting. Pt reports being prescribed PO antibiotics for UTI and STI. Pt states she has been compliant with medications, but began vomiting last night. Pt endorses RLQ abdominal pain and Right sided lower back pain. Upon ED arrival pt is well appearing. Breathing is even and unlabored. Skin is warm, dry & in tact. Pt denies CP, SOB, HA, numbness, tingling. IV access obtained. Comfort & safety provided.

## 2024-09-30 NOTE — CONSULT NOTE ADULT - ASSESSMENT
32yo  LMP  with a PMH of PTSD, anxiety on Suboxone presenting with nausea and vomiting x2-3days. Pt afebrile, Vitals stable. Physical exam unremarkable. TVUS redemonstrates a 2cm right ovarian hemorrhagic cyst with small complex free fluid.     #hemorrhagic cyst  - No acute GYN interventions  - Pt is hemodynamically stable, afebrile  - given negative CMT, adnexa non tender and no abdominal pain, low concern for PID or TOA    #+urine chlamydia  - Obtain uCG  - Continue doxycycline regimen  - Can Rx zofran PRN for nausea  - Consider discontinuing Macrobid   - N/v likely due to antibiotic vs. opoid withdrawal from discontinuing Suboxone or a combination of both  - Patient should not engage in sexual intercourse for at least 7 days after completion of her antibiotic course.   - Additionally, all sexual partners should be tested and treated and she should not engage in sexual intercourse with previous partners until 7 days from completion of their treatment.  - f/u with GYN in one month for test of cure and to establish care  - Pt can be discharged from a GYN perspective   - rest of care per ED    Karen Napier PGY 2  Discussed with Dr. Bonilla, attending  34yo  LMP  with a PMH of PTSD, anxiety on Suboxone presenting with nausea and vomiting x2-3days. Pt afebrile, Vitals stable. Physical exam unremarkable. TVUS redemonstrates a 2cm right ovarian hemorrhagic cyst with small complex free fluid.     #hemorrhagic cyst  - No acute GYN interventions  - Pt is hemodynamically stable, afebrile  - given negative CMT, adnexa non tender and no abdominal pain, low concern for PID or TOA    #+urine chlamydia  - Obtain uCG  - Continue doxycycline regimen  - Can Rx zofran PRN for nausea  - Consider discontinuing Macrobid   - N/v likely due to antibiotic vs. opoid withdrawal from discontinuing Suboxone or a combination of both  - Patient should not engage in sexual intercourse for at least 7 days after completion of her antibiotic course.   - Additionally, all sexual partners should be tested and treated and she should not engage in sexual intercourse with previous partners until 7 days from completion of their treatment.  - f/u with GYN in one month for test of cure and to establish care  - Pt can be discharged from a GYN perspective   - rest of care per ED    [facility]  [address]  [address]  [place], [place], [id number] [phone]    Karen Napier PGY 2  Discussed with Dr. Bonilla, attending

## 2024-09-30 NOTE — ED PROVIDER NOTE - NSFOLLOWUPINSTRUCTIONS_ED_ALL_ED_FT
right ovarian cyst  -hemangioma of the liver: followup with your primary doctor for an MRI  -gallstones in the gallbladder findings from your US:  - right ovarian cyst, followup with gynecology  - hemangioma of the liver: followup with your primary doctor for an MRI  - gallstones in the gallbladder, followup with your primary doctor findings from your US:  - right ovarian cyst: followup with gynecology  - hemangioma of the liver: followup with your primary doctor for an MRI  - gallstones in the gallbladder: followup with your primary doctor You were seen in the emergency department for nausea and vomiting. You recently tested positive for a urinary tract infection and a sexually transmitted infection.  We repeated some of your blood work and imaging including an ultrasound and gave you a couple of doses of antibiotics.  Your presentation today is consistent with a kidney infection called pyelonephritis. There were no signs of an ovarian abscess but you do have a small ovarian cyst that may be bleeding which you should follow-p with Ob/Gyn for. Pregancy test was negative. Overall you look well and can be treated at home with antibiotics.  Please look at the incidental findings on the ultrasound listed below that you should follow-up on with your doctors.    DO NOT HAVE SEXUAL INTERCOURSE WITH ANYONE FOR 7 DAYS WHILE RECEIVING TREATMENT.    Please stop taking the doxycycline and Macrobid/nitrofurantoin.  We have sent cefpodoxime and zofran (anti-nausea medication) to your pharmacy. Start taking cefpodoxime 200 mg twice daily x 14 days and you may take Zofran every 6 hours as needed for nausea/vomiting.    Take Tylenol 1000 mg every 6 hours as needed for fever or discomfort/pain.    We have placed a referral for OB/GYN as well as a PCP.  They should call you in a couple days to schedule an appointment but if they do not please call the number listed.    Return to the emergency department for any of the following symptoms: Inability to eat or drink anything, worsening severe abdominal pain, high fevers despite treatment with the antibiotic, chest pain, shortness of breath, you pass out, bloody vomit, bloody stools.    There are some also incidental findings on your ultrasound which are listed below.  Please tell your doctor about these findings.    findings from your US you need to follow-up:  - right ovarian cyst: followup with gynecology  - hemangioma of the liver: followup with your primary doctor for an MRI  - gallstones in the gallbladder: followup with your primary doctor You were seen in the emergency department for nausea and vomiting. You recently tested positive for a urinary tract infection and a sexually transmitted infection.  We repeated some of your blood work and imaging including an ultrasound and gave you antibiotics.  Your presentation today is consistent with a kidney infection called pyelonephritis. There were no signs of an ovarian abscess but you do have a small ovarian cyst that may be bleeding which you should follow-p with your gynecologist.  Your pregnancy test was negative. Please look at the incidental findings on the ultrasound listed below that you should follow-up on with your doctors.    DO NOT HAVE SEXUAL INTERCOURSE WITH ANYONE FOR AT LEAST 7 DAYS FROM COMPLETION OF YOUR ANTIBIOTICS (MEANING NO INTERCOURSE UNTIL AT LEAST DAYS AFTER THE LAST DAY OF ANTIBIOTICS).      Please stop taking the doxycycline and Macrobid/nitrofurantoin.  We have sent cefpodoxime and zofran (anti-nausea medication) to your pharmacy. Start taking cefpodoxime 200 mg twice daily x 14 days and you may take Zofran every 8 hours as needed for nausea/vomiting.    You may take Tylenol 1000 mg every 6-8 hours as needed.  Do not take more than 4000mg in a 24 hour period.    We have placed a referral for OB/GYN as well as a PCP.  They should call you in a couple days to schedule an appointment but if they do not please call the number listed.    Return to the emergency department for any of the following symptoms: Inability to eat or drink anything, worsening severe abdominal pain, high fevers despite treatment with the antibiotic, chest pain, shortness of breath, you pass out, bloody vomit, bloody stools.    There are some also incidental findings on your ultrasound which are listed below.  Please tell your doctor about these findings.    findings from your US you need to follow-up:  - right ovarian cyst: followup with gynecology  - hemangioma of the liver: followup with your primary doctor for an MRI  - gallstones in the gallbladder: followup with your primary doctor

## 2024-09-30 NOTE — ED PROVIDER NOTE - CLINICAL SUMMARY MEDICAL DECISION MAKING FREE TEXT BOX
33-year-old female with history of PTSD, anxiety, OUD on Suboxone 12 mg/day presenting for evaluation of nausea and vomiting since last night in the setting of recent UTI and chlamydial infections on doxycycline and Macrobid x 2 days.  Unable to tolerate p.o.  Overall she looks nontoxic without tachycardia and is afebrile here but does have R CVA TTP as well as suprapubic TTP. Pyelonephritis is certainly a possibility as she has a diagnosed UTI and has only been on antibiotics for a couple days.  Tubo ovarian abscess is also on the differential.   Seems there is a possible component of opioid withdrawal here given she has stopped her Suboxone for a couple of days and is endorsing symptoms of opioid withdrawal including vomiting, diarrhea, rhinorrhea. Plan for CBC, CMP, inflammatory markers, IVF, transvaginal US, symptomatic treatment and reassess need for additional imaging. 33-year-old female with history of PTSD, anxiety, OUD on Suboxone 12 mg/day presenting for evaluation of nausea and vomiting since last night in the setting of recent UTI and chlamydial infections on doxycycline and Macrobid x 2 days.  Unable to tolerate p.o.  Overall she looks nontoxic without tachycardia and is afebrile here but does have R CVA TTP as well as suprapubic TTP. Pyelonephritis is certainly a possibility as she has a diagnosed UTI and has only been on antibiotics for a couple days.  Tubo ovarian abscess is also on the differential.   Seems there is a possible component of opioid withdrawal here given she has stopped her Suboxone for a couple of days and is endorsing symptoms of opioid withdrawal including vomiting, diarrhea, rhinorrhea. Plan for CBC, CMP, inflammatory markers, IVF, transvaginal US, symptomatic treatment and reassess need for additional imaging.  RGUJRAL 33-year-old female history listed presents with nausea vomiting since Saturday.  Patient was seen here at the hospital and was diagnosed with chlamydia and a UTI and started on antibiotics.  Patient states she took her antibiotics on Saturday morning and then developed symptoms of vomiting after taking it.  Patient was taking her Suboxone prior to the the vomiting.  Patient complains of suprapubic abdominal pain and right posterior back pain.  States she had a low-grade temperature last night.  1 episode of loose bowel movement.  Denies any vaginal discharge.  On exam, patient with dry mucous membranes, abdomen is soft mild tender to palpation suprapubic.  Right CVA tenderness mild.  Will obtain screening labs repeat ultrasound pelvis to evaluate for acute tubo-ovarian abscess.  Differential diagnosis includes pyelonephritis.  IV fluids patient's urine culture reviewed and is pansensitive.  Supportive care with antiemetics and pain control and monitor.

## 2024-09-30 NOTE — ED PROVIDER NOTE - PROGRESS NOTE DETAILS
Attending MD Price: Spoke with Gyn, do not suspect TOA or PID at this time, recommend HCG, will send, recommend Gyn follow up in a month.  Will await final note with recs. Tayo Holliday MD PGY-1: feeling much better after medication. GIving dose of azithromycine here and will dc doxycycline and send home with cefpodoxime. Expect she will likely DC home pending final results. Tayo Holliday MD PGY-1: feeling much better after medication. Giving dose of azithromycine and CTX here and will dc doxycycline/macrobid and send home with cefpodoxime. Expect she will likely DC home pending final results. Tayo Holliday MD PGY-1: tolerating PO, plan to DC home with appropraite follow-up with Gyn, PCP and a course of cefpodoxime Attending MD Price: Patient re-evaluated and feeling improved.  No acute issues at  this time.  Lab and radiology tests reviewed with patient.  Reports tolerating po.  Reports informed partner of chlamydia.  Verbalized understanding aware that she needs to abstain until 7 days after completing treatment. Verbalized understanding of dc'ing Macrobid and Doxy and taking Cefpodoxime 200mg twice daily for two weeks.  Verbalized understanding  that she must follow up with gyn.  Patient stable for discharge. Follow up instructions given, importance of follow up emphasized, return to ED parameters reviewed and patient verbalized understanding.  All questions answered, all concerns addressed.

## 2024-10-02 LAB
CULTURE RESULTS: SIGNIFICANT CHANGE UP
SPECIMEN SOURCE: SIGNIFICANT CHANGE UP

## 2024-10-02 RX ORDER — CEFPODOXIME PROXETIL 100 MG/5ML
1 GRANULE, FOR SUSPENSION ORAL
Qty: 28 | Refills: 0
Start: 2024-10-02 | End: 2024-10-15

## 2024-10-02 NOTE — ED POST DISCHARGE NOTE - ADDITIONAL DOCUMENTATION
10/2/24 Amna DUKE: pt called stating pharmacy didn't receive prescription. will resubmit as initially intended by original prescriber. I did not evaluate this patient and I am not treating this patient, I am resubmitting already intended rx. pt also informed of elevated ESR and instructed to f/up with PCP. 10/2/24 Amna DUKE: pt called stating pharmacy didn't receive prescription. will resubmit as initially intended by original prescriber. I did not evaluate this patient and I am not treating this patient, I am resubmitting already intended rx. pt also informed of elevated ESR and instructed to f/up with PCP. 10/2/24 Amna CARRERO- received call from pharmacy, cefpodoxime not covered. reviewed chart, pt was prescribed Cefpodoxime for pyelo but urine culture is completely negative. pt received the 1g Azithro as alternative to doxy for chlamydia. Explained everything to patient and cancelled rx with preferred pharmacy. pt instructed to still f/up with pcp and have testing repeated to make sure STD clears.

## 2024-10-10 PROBLEM — Z78.9 OTHER SPECIFIED HEALTH STATUS: Chronic | Status: ACTIVE | Noted: 2022-12-06

## 2024-12-09 ENCOUNTER — EMERGENCY (EMERGENCY)
Facility: HOSPITAL | Age: 34
LOS: 1 days | Discharge: HOME | End: 2024-12-09
Attending: EMERGENCY MEDICINE | Admitting: EMERGENCY MEDICINE
Payer: MEDICAID

## 2024-12-09 VITALS
HEIGHT: 64 IN | TEMPERATURE: 98 F | DIASTOLIC BLOOD PRESSURE: 85 MMHG | SYSTOLIC BLOOD PRESSURE: 120 MMHG | HEART RATE: 89 BPM | WEIGHT: 175.49 LBS | OXYGEN SATURATION: 99 % | RESPIRATION RATE: 15 BRPM

## 2024-12-09 VITALS
TEMPERATURE: 98 F | HEART RATE: 89 BPM | OXYGEN SATURATION: 100 % | SYSTOLIC BLOOD PRESSURE: 115 MMHG | DIASTOLIC BLOOD PRESSURE: 80 MMHG | RESPIRATION RATE: 16 BRPM

## 2024-12-09 PROBLEM — Z78.9 OTHER SPECIFIED HEALTH STATUS: Chronic | Status: ACTIVE | Noted: 2024-09-25

## 2024-12-09 LAB
ALBUMIN SERPL ELPH-MCNC: 3.9 G/DL — SIGNIFICANT CHANGE UP (ref 3.3–5)
ALP SERPL-CCNC: 61 U/L — SIGNIFICANT CHANGE UP (ref 30–120)
ALT FLD-CCNC: 20 U/L — SIGNIFICANT CHANGE UP (ref 10–60)
ANION GAP SERPL CALC-SCNC: 9 MMOL/L — SIGNIFICANT CHANGE UP (ref 5–17)
APPEARANCE UR: ABNORMAL
AST SERPL-CCNC: 15 U/L — SIGNIFICANT CHANGE UP (ref 10–40)
BACTERIA # UR AUTO: ABNORMAL /HPF
BASOPHILS # BLD AUTO: 0.02 K/UL — SIGNIFICANT CHANGE UP (ref 0–0.2)
BASOPHILS NFR BLD AUTO: 0.4 % — SIGNIFICANT CHANGE UP (ref 0–2)
BILIRUB SERPL-MCNC: 0.4 MG/DL — SIGNIFICANT CHANGE UP (ref 0.2–1.2)
BILIRUB UR-MCNC: NEGATIVE — SIGNIFICANT CHANGE UP
BLD GP AB SCN SERPL QL: SIGNIFICANT CHANGE UP
BUN SERPL-MCNC: 8 MG/DL — SIGNIFICANT CHANGE UP (ref 7–23)
CALCIUM SERPL-MCNC: 9.2 MG/DL — SIGNIFICANT CHANGE UP (ref 8.4–10.5)
CHLORIDE SERPL-SCNC: 101 MMOL/L — SIGNIFICANT CHANGE UP (ref 96–108)
CO2 SERPL-SCNC: 28 MMOL/L — SIGNIFICANT CHANGE UP (ref 22–31)
COLOR SPEC: YELLOW — SIGNIFICANT CHANGE UP
CREAT SERPL-MCNC: 0.8 MG/DL — SIGNIFICANT CHANGE UP (ref 0.5–1.3)
DIFF PNL FLD: NEGATIVE — SIGNIFICANT CHANGE UP
EGFR: 98 ML/MIN/1.73M2 — SIGNIFICANT CHANGE UP
EOSINOPHIL # BLD AUTO: 0.12 K/UL — SIGNIFICANT CHANGE UP (ref 0–0.5)
EOSINOPHIL NFR BLD AUTO: 2.4 % — SIGNIFICANT CHANGE UP (ref 0–6)
EPI CELLS # UR: PRESENT
GLUCOSE SERPL-MCNC: 99 MG/DL — SIGNIFICANT CHANGE UP (ref 70–99)
GLUCOSE UR QL: NEGATIVE MG/DL — SIGNIFICANT CHANGE UP
HCG SERPL-ACNC: 362 MIU/ML — HIGH
HCT VFR BLD CALC: 39 % — SIGNIFICANT CHANGE UP (ref 34.5–45)
HGB BLD-MCNC: 13.1 G/DL — SIGNIFICANT CHANGE UP (ref 11.5–15.5)
IMM GRANULOCYTES NFR BLD AUTO: 0.2 % — SIGNIFICANT CHANGE UP (ref 0–0.9)
KETONES UR-MCNC: ABNORMAL MG/DL
LEUKOCYTE ESTERASE UR-ACNC: ABNORMAL
LIDOCAIN IGE QN: 17 U/L — SIGNIFICANT CHANGE UP (ref 16–77)
LYMPHOCYTES # BLD AUTO: 1.1 K/UL — SIGNIFICANT CHANGE UP (ref 1–3.3)
LYMPHOCYTES # BLD AUTO: 22 % — SIGNIFICANT CHANGE UP (ref 13–44)
MCHC RBC-ENTMCNC: 30.8 PG — SIGNIFICANT CHANGE UP (ref 27–34)
MCHC RBC-ENTMCNC: 33.6 G/DL — SIGNIFICANT CHANGE UP (ref 32–36)
MCV RBC AUTO: 91.5 FL — SIGNIFICANT CHANGE UP (ref 80–100)
MONOCYTES # BLD AUTO: 0.36 K/UL — SIGNIFICANT CHANGE UP (ref 0–0.9)
MONOCYTES NFR BLD AUTO: 7.2 % — SIGNIFICANT CHANGE UP (ref 2–14)
NEUTROPHILS # BLD AUTO: 3.4 K/UL — SIGNIFICANT CHANGE UP (ref 1.8–7.4)
NEUTROPHILS NFR BLD AUTO: 67.8 % — SIGNIFICANT CHANGE UP (ref 43–77)
NITRITE UR-MCNC: NEGATIVE — SIGNIFICANT CHANGE UP
NRBC # BLD: 0 /100 WBCS — SIGNIFICANT CHANGE UP (ref 0–0)
PH UR: 6 — SIGNIFICANT CHANGE UP (ref 5–8)
PLATELET # BLD AUTO: 247 K/UL — SIGNIFICANT CHANGE UP (ref 150–400)
POTASSIUM SERPL-MCNC: 3.3 MMOL/L — LOW (ref 3.5–5.3)
POTASSIUM SERPL-SCNC: 3.3 MMOL/L — LOW (ref 3.5–5.3)
PROT SERPL-MCNC: 7.6 G/DL — SIGNIFICANT CHANGE UP (ref 6–8.3)
PROT UR-MCNC: SIGNIFICANT CHANGE UP MG/DL
RBC # BLD: 4.26 M/UL — SIGNIFICANT CHANGE UP (ref 3.8–5.2)
RBC # FLD: 12.3 % — SIGNIFICANT CHANGE UP (ref 10.3–14.5)
RBC CASTS # UR COMP ASSIST: 0 /HPF — SIGNIFICANT CHANGE UP (ref 0–4)
SODIUM SERPL-SCNC: 138 MMOL/L — SIGNIFICANT CHANGE UP (ref 135–145)
SP GR SPEC: 1.02 — SIGNIFICANT CHANGE UP (ref 1–1.03)
UROBILINOGEN FLD QL: 1 MG/DL — SIGNIFICANT CHANGE UP (ref 0.2–1)
WBC # BLD: 5.01 K/UL — SIGNIFICANT CHANGE UP (ref 3.8–10.5)
WBC # FLD AUTO: 5.01 K/UL — SIGNIFICANT CHANGE UP (ref 3.8–10.5)
WBC UR QL: 3 /HPF — SIGNIFICANT CHANGE UP (ref 0–5)

## 2024-12-09 PROCEDURE — 85025 COMPLETE CBC W/AUTO DIFF WBC: CPT

## 2024-12-09 PROCEDURE — 81001 URINALYSIS AUTO W/SCOPE: CPT

## 2024-12-09 PROCEDURE — 87186 SC STD MICRODIL/AGAR DIL: CPT

## 2024-12-09 PROCEDURE — 84702 CHORIONIC GONADOTROPIN TEST: CPT

## 2024-12-09 PROCEDURE — 87077 CULTURE AEROBIC IDENTIFY: CPT

## 2024-12-09 PROCEDURE — 87086 URINE CULTURE/COLONY COUNT: CPT

## 2024-12-09 PROCEDURE — 86900 BLOOD TYPING SEROLOGIC ABO: CPT

## 2024-12-09 PROCEDURE — 76830 TRANSVAGINAL US NON-OB: CPT

## 2024-12-09 PROCEDURE — 96374 THER/PROPH/DIAG INJ IV PUSH: CPT

## 2024-12-09 PROCEDURE — 76700 US EXAM ABDOM COMPLETE: CPT

## 2024-12-09 PROCEDURE — 99285 EMERGENCY DEPT VISIT HI MDM: CPT

## 2024-12-09 PROCEDURE — 83690 ASSAY OF LIPASE: CPT

## 2024-12-09 PROCEDURE — 76700 US EXAM ABDOM COMPLETE: CPT | Mod: 26

## 2024-12-09 PROCEDURE — 76830 TRANSVAGINAL US NON-OB: CPT | Mod: 26

## 2024-12-09 PROCEDURE — 86850 RBC ANTIBODY SCREEN: CPT

## 2024-12-09 PROCEDURE — 36415 COLL VENOUS BLD VENIPUNCTURE: CPT

## 2024-12-09 PROCEDURE — 80053 COMPREHEN METABOLIC PANEL: CPT

## 2024-12-09 PROCEDURE — 86901 BLOOD TYPING SEROLOGIC RH(D): CPT

## 2024-12-09 PROCEDURE — 99284 EMERGENCY DEPT VISIT MOD MDM: CPT | Mod: 25

## 2024-12-09 RX ORDER — SODIUM CHLORIDE 9 MG/ML
1000 INJECTION, SOLUTION INTRAMUSCULAR; INTRAVENOUS; SUBCUTANEOUS ONCE
Refills: 0 | Status: COMPLETED | OUTPATIENT
Start: 2024-12-09 | End: 2024-12-09

## 2024-12-09 RX ORDER — ACETAMINOPHEN 500MG 500 MG/1
1000 TABLET, COATED ORAL ONCE
Refills: 0 | Status: COMPLETED | OUTPATIENT
Start: 2024-12-09 | End: 2024-12-09

## 2024-12-09 RX ADMIN — SODIUM CHLORIDE 1000 MILLILITER(S): 9 INJECTION, SOLUTION INTRAMUSCULAR; INTRAVENOUS; SUBCUTANEOUS at 14:02

## 2024-12-09 RX ADMIN — ACETAMINOPHEN 500MG 1000 MILLIGRAM(S): 500 TABLET, COATED ORAL at 12:31

## 2024-12-09 RX ADMIN — ACETAMINOPHEN 500MG 1000 MILLIGRAM(S): 500 TABLET, COATED ORAL at 12:32

## 2024-12-09 RX ADMIN — ACETAMINOPHEN 500MG 400 MILLIGRAM(S): 500 TABLET, COATED ORAL at 12:17

## 2024-12-09 RX ADMIN — SODIUM CHLORIDE 1000 MILLILITER(S): 9 INJECTION, SOLUTION INTRAMUSCULAR; INTRAVENOUS; SUBCUTANEOUS at 12:17

## 2024-12-09 NOTE — ED ADULT NURSE NOTE - OBJECTIVE STATEMENT
35 yo  patient complaining of right upper quadrant abdominal pain.  Patient krista has had the pain intermittently for the past 3 months has been seen at 2 other hospitals recently was diagnosed with gallstones recommended to have cholecystectomy but she declined.  Patient krista pain is worsening especially when eating spicy food.  Patient reports LMP November 5 had a positive home pregnancy test.  No fevers chills vomiting diarrhea dysuria hematuria frequency vaginal bleeding or discharge.

## 2024-12-09 NOTE — ED PROVIDER NOTE - OBJECTIVE STATEMENT
Patient complaining of right upper quadrant abdominal pain.  Patient krista has had the pain intermittently for the past 3 months has been seen at 2 other hospitals recently was diagnosed with gallstones recommended to have cholecystectomy but she declined.  Patient krista pain is worsening especially when eating spicy food.  Patient reports LMP November 5 had a positive home pregnancy test.  No fevers chills vomiting diarrhea dysuria hematuria frequency vaginal bleeding or discharge.

## 2024-12-09 NOTE — ED PROVIDER NOTE - CARE PROVIDER_API CALL
Eugene Grigsby Russ  Surgery  [address], [address]  [place] [address]  Phone: [phone]  Fax: [phone]  Follow Up Time: 1-3 Days    Brannon Gillespie  Obstetrics and Gynecology  [address], [address]  [place] [address]  Phone: [phone]  Fax: [phone]  Scheduled Appointment: 12/27/2024 01:30 PM

## 2024-12-09 NOTE — ED PROVIDER NOTE - CARE PROVIDERS DIRECT ADDRESSES
[email or web address]

## 2024-12-09 NOTE — CONSULT NOTE ADULT - SUBJECTIVE AND OBJECTIVE BOX
FABIEN LOZA 34y  MRN-[id number]      THIS IS A 34 YO FEMALE WITH SIGNIFICANT PAST MEDICAL HISTORY OF ANXIETY, PTSD, OPIATE ABUSE ON SUBOXONE LAST TAKEN 2024, KNOWN H/O GALLSTONES PRESENTS TO ER FOR EVALUATION OF MAINLY RUQ AND EPIGASTRIC ABDOMINAL PAIN SINCE SEPTEMBER ON/ OFF ASSOCIATED  WITH SOME NAUSEA. SHE WAS SEEN  AT [place] FOR LOWER  ABDOMINAL PAIN FOLLOWING UTI AND CHLAMYDIA INFECTION DIAGNOSED  , INCIDENTAL FINDING OF GALLSTONE ON ULTRASOUND. SHE WAS SEEN IN [facility] THREE WEEKS AGO FOR ABDOMINAL PAIN WAS OFFERED CHOLECYSTECTOMY,  SHE DECLINED.       SHE DENIES ANY N/V, FEVER, CHILLS, JAUNDICE, FAMILY HISTORY OF GALLSTONES, H/O PUD, NSAIDs/ ETOH / ASA USE, PANCREATITIS, HEMATEMESIS, HEPATITIS, RECENT TRAVEL, FOOD INTOLERANCE, TRAUMA.     SHE TOLERATED CHINESE FOOD LAST NIGHT, HAD NORMAL BM. SHE ADMITS TO PREGNANCY AND HAD A POSITIVE  HOME PREGNANCY TEST YESTERDAY.       PAST MEDICAL & SURGICAL HISTORY:  ANXIETY  PTSD  OPIATE ABUSE   GALLSTONE  CHLAMYDIA   LMP 2024 + HOME PREGNANCY TEST     Home Medications:  SUBOXONE    Allergies  PCN    TOBACCO USE: DENIES  ALCOHOL USE: DENIES  DRUG USE: DENIES   ON SUBOXONE , H/O PERCOCET ABUSE FOR DENTAL PAIN     REVIEW OF SYSTEMS: SEE PAST MEDICAL AND SURGICAL HISTORY, HPI     CONSTITUTIONAL: No weakness, fevers or chills  EYES/ENT: No visual changes;  No vertigo or throat pain   NECK: No pain or stiffness  RESPIRATORY: No cough, wheezing, hemoptysis; No shortness of breath  CARDIOVASCULAR: No chest pain or palpitations  GASTROINTESTINAL: SEE HPI   GENITOURINARY: No dysuria, frequency or hematuria  NEUROLOGICAL: No numbness or weakness  SKIN: No itching, rashes    Vital Signs (24 Hrs):  T(C): 37.2 (24 @ 14:30), Max: 37.2 (24 @ 14:30)  HR: 91 (24 @ 14:30) (89 - 91)  BP: 107/75 (24 @ 14:30) (107/75 - 120/85)  RR: 16 (24 @ 14:30) (15 - 16)  SpO2: 100% (24 @ 14:30) (99% - 100%)    Daily Height in cm: 162.56 (09 Dec 2024 11:19)    Daily     PHYSICAL EXAM:    GENERAL: NAD  HEAD:  ATRAUMATIC, NORMOCEPHALIC  EYES: EOMI, PERRLA, CONJUNCTIVA AND SCLERA CLEAR AND ANICTERIC   ENT: MOIST MUCOUS MEMBRANE   NECK: SUPPLE, NO JVD  CHEST/LUNG: CLEAR TO AUSCULTATION, NO W/R/R  HEART: REGULAR RATE, RHYTHM, NO MURMUR, RUBS, GALLOPS  ABDOMEN:  + BS, SOFT, NOT DISTENDED, NO PALPABLE MASS, RUQ , EPIGATSRIC AND LUQ TENDERNESS ON DEEP PALPATION WITHOUT  REBOUND. NO GUARDING/ RIGIDITY. NEGATIVE AUSTIN'S SIGN. NO CVA  TENDERNESS   EXTREMITIES:  2+ PERIPHERAL PULSES, BRISK CAPILLARY REFILL. NO CLUBBING, CYANOSIS, OR EDEMA.   NERVOUS SYSTEM: ALERT AND ORIENTED X3, CLEAR SPEECH . NO DEFICITS   MUSCULOSKELETAL : FROM ALL 4 EXTREMITIES, FULL AND EQUAL STRENGTH   SKIN: NO RASH, INTACT. NO JAUNDICE M, MULTIPLE TATTOOS                         13.1   5.01  )-----------( 247      ( 09 Dec 2024 11:57 )             39.0     09 Dec 2024 11:57    138    |  101    |  8      ----------------------------<  99     3.3     |  28     |  0.80     Ca    9.2        09 Dec 2024 11:57    TPro  7.6    /  Alb  3.9    /  TBili  0.4    /  DBili  x      /  AST  15     /  ALT  20     /  AlkPhos  61     09 Dec 2024 11:57    Urinalysis Basic - ( 09 Dec 2024 11:58 )    Color: Yellow / Appearance: Cloudy / S.024 / pH: x  Gluc: x / Ketone: Trace mg/dL  / Bili: Negative / Urobili: 1.0 mg/dL   Blood: x / Protein: Trace mg/dL / Nitrite: Negative   Leuk Esterase: Small / RBC: 0 /HPF / WBC 3 /HPF   Sq Epi: x / Non Sq Epi: x / Bacteria: Occasional /HPF    HCG Quantitative, Serum (24 @ 11:57)    HCG Quantitative, Serum: 362: HCG    US ABDOMEN COMPLETE   ORDERED BY: LEONEL MARRUFO   PROCEDURE DATE:  2024      INTERPRETATION:  CLINICAL INFORMATION: Intermittent right upper quadrant   pain for 3 months. History of gallstones. Positive pregnancy test.    COMPARISON: None available.    TECHNIQUE: Sonography of the abdomen.    FINDINGS:  Liver: There is a well-circumscribed 1.1 x 1.0 cm echogenic lesion in the   right lobe of the liver superiorly which may represent a hemangioma.   Otherwise, within normal limits.  Bile ducts: Normal caliber. Common bile duct measures 4 mm.  Gallbladder: Multiple faceted layering gallstones. No gallbladder wall   thickening or pericholecystic fluid. A sonographic Austin's sign was not   elicited.  Pancreas: The pancreas is obscured by overlying bowel gas.  Spleen: 10.9 cm. Within normal limits.  Right kidney: 11.7 cm. No hydronephrosis. Normal corticomedullary   differentiation.  Left kidney: 11.1 cm. No hydronephrosis. Normal corticomedullary   differentiation.  Ascites: None.  Aorta and IVC: Visualized portions are within normal limits.    IMPRESSION:  Multiple layering faceted gallstones. No sonographic evidence of acute   cholecystitis or biliary dilatation. Echogenic right liver lesion,   suggestive of a hemangioma.    SYEDA PEREZ DO; Resident Radiologist  This document has been electronically signed.  RUCHI NIEVES MD; Attending Radiologist      S TRANSVAGINAL   ORDERED BY: LEONEL MARRUFO   PROCEDURE DATE:  2024      INTERPRETATION:  CLINICAL INFORMATION: Pain. Positive home pregnancy test    LMP: 2025    COMPARISON: 2024    TECHNIQUE:  Endovaginal pelvic sonogram only. Color and Spectral Doppler was   performed.    FINDINGS:  Uterus: 7.8 cm x 5.3 cm x 5.9 cm. unremarkable.  Endometrium: 15 mm. Within normal limits.    Right ovary: 3.6 cm x 2.1 cm x 3.3 cm. corpus luteum. Normal arterial and   venous waveforms.  Left ovary: 3.4 cm x 2.0 cm x 3.1 cm. Within normal limits. Normal   arterial and venous waveforms.    Fluid: None.    IMPRESSION:  Prominent endometrium without evidence of intrauterine gestational sac.   This may representearly pregnancy too early to be seen versus ectopic   pregnancy. Serial beta hCG follow-up recommended.    MAL GARCIA MD; Attending Radiologist    ASSESSMENT AND PLAN     PREGNANT   GALLSTONE , BILARY COLIC   H/O OPIATE ABUSE ON SUBOXONE  H/O ANXIETY, PTSD    TRIAL OF DIET  FOLLOW UP AS OUT PATIENT, PLAN FOR CHOLECYSTOTOMY AFTER PREGNANCY  OB/ GYN FOLLOW UP     PLAN DISCUSSED WITH PATIENT, DR. MARRUFO.    CASE DISCUSSED WITH DR. LOVELACE

## 2024-12-09 NOTE — ED PROVIDER NOTE - DIFFERENTIAL DIAGNOSIS
Differential Diagnosis Differential including but not limited to biliary colic cholecystitis gastritis

## 2024-12-09 NOTE — ED PROVIDER NOTE - PROGRESS NOTE DETAILS
Discussed with surgery PA she will see patient Discussed with surgery PA he saw evaluated relates Dr. Grigsby recommends p.o. challenge if tolerates discharge to follow-up as outpatient Discussed with Dr. Grigsby (attending surgeon) he saw and evaluated patient cleared for discharge and outpatient follow-up.  Reevaluated patient at bedside.  Patient feeling much improved, tolerating PO.  Discussed the results of all diagnostic testing in ED and copies of all reports given.   An opportunity to ask questions was given.  Discussed the importance of prompt, close medical follow-up.  Patient will return with any changes, concerns or persistent / worsening symptoms.  Understanding of all instructions verbalized.

## 2024-12-09 NOTE — ED PROVIDER NOTE - PATIENT PORTAL LINK FT
You can access the FollowMyHealth Patient Portal offered by [facility] by registering at the following website: [email or web address]. By joining [employer]’s FollowMyHealth portal, you will also be able to view your health information using other applications (apps) compatible with our system.

## 2024-12-09 NOTE — ED PROVIDER NOTE - PROVIDER TOKENS
PROVIDER:[TOKEN:[[id number]],FOLLOWUP:[1-3 Days]],PROVIDER:[TOKEN:[[id number]:[facility]:[id number]],SCHEDULEDAPPT:[12/27/2024],SCHEDULEDAPPTTIME:[01:30 PM]]

## 2024-12-09 NOTE — ED PROVIDER NOTE - CLINICAL SUMMARY MEDICAL DECISION MAKING FREE TEXT BOX
Patient complaining of right upper quadrant abdominal pain.  Patient krista has had the pain intermittently for the past 3 months has been seen at 2 other hospitals recently was diagnosed with gallstones recommended to have cholecystectomy but she declined.  Patient krista pain is worsening especially when eating spicy food.  Patient reports LMP November 5 had a positive home pregnancy test.  No fevers chills vomiting diarrhea dysuria hematuria frequency vaginal bleeding or discharge.    Plan labs abdominal ultrasound pelvic ultrasound IV fluids Ofirm[facility]    Differential including but not limited to biliary colic cholecystitis gastritis

## 2024-12-09 NOTE — ED ADULT NURSE NOTE - TEMPLATE
After Visit Summary   9/16/2018    Tee Whtie    MRN: [id number]           Patient Information     Date Of Birth          2017        Visit Information        Provider Department      9/16/2018 5:45 PM Mayelin Paul PA-C [facility]        Today's Diagnoses     Laceration of chin, initial encounter    -  1      Care Instructions      Chin Laceration, Skin Glue (Child)  A chin laceration is a cut in the skin of the chin. The skin may be cut in a fall, or by a sharp object or fingernail. It can bleed, and cause redness and swelling.  A chin laceration is first treated with pressure to stop any bleeding. The area is then cleaned with soap and warm water. A cut that is not deep can be closed with skin glue. Skin glue is used on lacerations that have smooth edges and are not infected. Skin glue is less painful than stitches. It may also cause less scarring.  In cases of a deeper cut, a lower layer of skin may be closed with stitches (sutures) first. Then the skin glue is used to close the upper layer of skin. The skin glue closes the cut within a few minutes. It also leaves a water-resistant covering on the skin. This can allow for faster healing. No bandage is needed. Skin glue peels off on its own within 5 to 10 days.  Certain types of skin glues can t be used if your child has an allergy to latex or formaldehyde. Please tell the health care provider right away if your child has an allergy.  Your child may also need a tetanus shot. This is given if the cause of the laceration may cause tetanus, and if your child has no record of a shot.  Home care  The healthcare provider may prescribe antibiotics. These are to prevent infection. They may be pills or a liquid for your child to take by mouth. Or they may be in a cream or ointment to put on the skin. Use the antibiotics as instructed every day until they are gone. Don t stop giving them to your child if he or she feels better.  Don t give your child aspirin unless you are told to by the healthcare provider.  General care    Follow your healthcare provider s instructions for how to care for the laceration.    Wash your hands with soap and warm water before and after caring for your child. This is to prevent infection.    Change bandages or dressings as directed. Replace any bandage that becomes wet or dirty.    Don t soak the laceration in water for 7 to 10 days. If your child is old enough, have him or her take showers instead of baths during this time. Use a clean cloth to gently pat the area dry if it gets wet.    Don t use lotion or ointment on the laceration. These may cause the skin glue to peel off.    Make sure your child does not scratch, rub, or pick at the area.  Follow-up care  Follow up with your child s healthcare provider, or as advised.  Special note to parents  If the skin glue does not peel off after 10 days, use petroleum jelly or an antibiotic ointment on the skin to remove the skin glue.  When to seek medical advice  Call your child's healthcare provider right away if any of these occur:    Fever of 100.4 F (38 C) or higher, or as directed by your child's healthcare provider.    Wound reopens or bleeds a lot    Pain gets worse    Redness or swelling gets worse    Foul-smelling fluid drains from the wound  Date Last Reviewed: 10/1/2016    7451-0682 [employer]. [address] [place] [address], [place] [address]. All rights reserved. This information is not intended as a substitute for professional medical care. Always follow your healthcare professional's instructions.                Follow-ups after your visit        Follow-up notes from your care team     Return if symptoms worsen or fail to improve.      Who to contact     If you have questions or need follow up information about today's clinic visit or your schedule please contact [facility] directly at [phone].  Normal or non-critical  lab and imaging results will be communicated to you by MyChart, letter or phone within 4 business days after the clinic has received the results. If you do not hear from us within 7 days, please contact the clinic through "[employer]t or phone. If you have a critical or abnormal lab result, we will notify you by phone as soon as possible.  Submit refill requests through [employer] or call your pharmacy and they will forward the refill request to us. Please allow 3 business days for your refill to be completed.          Additional Information About Your Visit        [employer] - Smartphone [employer] Information     [employer] lets you send messages to your doctor, view your test results, renew your prescriptions, schedule appointments and more. To sign up, go to [email or web address], contact your [facility] clinic or call [phone] during business hours.            Care EveryWhere ID     This is your Care EveryWhere ID. This could be used by other organizations to access your [facility] medical records  [id number]        Your Vitals Were     Pulse Temperature Respirations             100 97.8  F (36.6  C) (Tympanic) 24          Blood Pressure from Last 3 Encounters:   06/22/18 98/62    Weight from Last 3 Encounters:   09/16/18 30 lb 9.6 oz (13.9 kg) (26 %)*   06/22/18 29 lb 3.2 oz (13.2 kg) (21 %)*   12/26/17 27 lb 3.2 oz (12.3 kg) (17 %)*     * Growth percentiles are based on CDC 2-20 Years data.              Today, you had the following     No orders found for display       Primary Care Provider Office Phone # Fax #    Gabriella Grier [phone]       [address]  [place] [address]        Equal Access to Services     [facility] : Hadii aad ernesto hadashcathy Sozbigniewali, waaxda luqadaha, qaybta kaalmada adeobdulioyada, ryan vick . So [facility] [phone].    ATENCIÓN: Si habla español, tiene a rosen disposición servicios gratuitos de asistencia lingüística. Llame al [phone].    We comply with  applicable federal civil rights laws and [place] laws. We do not discriminate on the basis of race, color, national origin, age, disability, sex, sexual orientation, or gender identity.            Thank you!     Thank you for choosing [facility]  for your care. Our goal is always to provide you with excellent care. Hearing back from our patients is one way we can continue to improve our services. Please take a few minutes to complete the written survey that you may receive in the mail after your visit with us. Thank you!             Your Updated Medication List - Protect others around you: Learn how to safely use, store and throw away your medicines at www.disposemymeds.org.      Notice  As of 9/16/2018  6:44 PM    You have not been prescribed any medications.       Abdominal Pain, N/V/D

## 2024-12-09 NOTE — CONSULT NOTE ADULT - NSCONSULTADDITIONALINFOA_GEN_ALL_CORE
agree with above unless contradicts below  gallstones, with new pregnancy,   will try and hold off due to baby  low fat diet  f/u prn

## 2024-12-09 NOTE — ED ADULT NURSE NOTE - NSFALLUNIVINTERV_ED_ALL_ED
Bed/Stretcher in lowest position, wheels locked, appropriate side rails in place/Call bell, personal items and telephone in reach/Instruct patient to call for assistance before getting out of bed/chair/stretcher/Non-slip footwear applied when patient is off stretcher/[place] to call system/Physically safe environment - no spills, clutter or unnecessary equipment/Purposeful proactive rounding/Room/bathroom lighting operational, light cord in reach

## 2024-12-09 NOTE — ED PROVIDER NOTE - NSFOLLOWUPINSTRUCTIONS_ED_ALL_ED_FT
Cholelithiasis  Body outline showing the digestive system with a close-up of the gallbladder with gallstones in it.   Cholelithiasis is a disease in which gallstones form in the gallbladder. The gallbladder is an organ that stores bile. Bile is a fluid that helps to digest fats. Gallstones begin as small crystals and can slowly grow into stones. They may cause no symptoms until they block the gallbladder duct, or cystic duct, when the gallbladder tightens, or contracts, after food is eaten. This can cause pain and is known as a gallbladder attack, or biliary colic.    There are two main types of gallstones:  Cholesterol stones. These are the most common type of gallstone. These stones are made of hardened cholesterol and are usually yellow-green in color.  Pigment stones. These are dark in color and are made of a red-yellow substance, called bilirubin,that forms when hemoglobin from red blood cells breaks down.  What are the causes?  This condition may be caused by too little or too much of the substances that are in bile. This can happen if the bile:  Has too much bilirubin. This can happen in certain blood diseases, such as sickle cell anemia.  Has too much cholesterol.  Does not have enough bile salts. These salts help the body absorb and digest fats.  It can also happen if the gallbladder is not emptying completely. This is common during pregnancy.    What increases the risk?  The following factors may make you more likely to develop this condition:  Being older than 40 years of age.  Eating a diet that is heavy in fried foods, fat, and refined carbohydrates, such as white bread and white rice.  Being female.  Having multiple pregnancies.  Using medicines that contain female hormones (estrogen) for a long time.  Having certain medical problems, such as:  Diabetes mellitus.  Obesity.  Cystic fibrosis.  Crohn's disease.  Cirrhosis or other long-term (chronic) liver disease.  Certain blood diseases, such as sickle cell anemia or leukemia.  Having a family history of gallstones.  Losing weight quickly.  What are the signs or symptoms?  In many cases, having gallstones causes no symptoms. These are called silent gallstones. If a gallstone blocks your bile duct, it can cause a gallbladder attack. The main symptom of a gallbladder attack is sudden pain in the upper right part of the abdomen. The pain:  Usually comes at night or after eating.  Can last for one hour or more.  Can spread to your right shoulder, back, or chest.  Can feel like indigestion. This is discomfort, burning, or fullness in your upper abdomen.  If the bile duct is blocked for more than a few hours, it can cause an infection or inflammation of your gallbladder (cholecystitis), liver, or pancreas. This can cause:  Nausea or vomiting.  Bloating.  Pain in your abdomen that lasts for 5 hours or longer.  Tenderness in your upper abdomen, often in the upper right section and under your rib cage.  Fever or chills.  Skin or the white parts of your eyes turning yellow (jaundice). This usually happens when a stone has blocked bile from passing through the bile duct.  Dark pee (urine) or light-colored poop (stools).  How is this diagnosed?  This condition may be diagnosed based on:  A physical exam.  Your medical history.  Ultrasound.  CT scan.  MRI.  You may also have other tests, including:  Blood tests to check for infection or inflammation.  The HIDA scan to see the gallbladder and the bile ducts.  An endoscope to check for blockage in the bile ducts.  How is this treated?  Treatment depends on the severity of your symptoms. Silent gallstones do not need treatment. You may need treatment if a blockage causes a gallbladder attack or other symptoms. Treatment may include:  If symptoms are mild, you may care for yourself at home. For mild symptoms:  Stop eating and drinking for 12–24 hours. You may drink water and clear liquids. This helps to "cool down" your gallbladder. After 1 or 2 days, eat a diet of simple or clear foods, such as broths and crackers.  Take medicines for pain or nausea.  Take antibiotics if you have an infection.  If symptoms are severe, you may:  Stay in the hospital for pain control or to treat severe infection.  Have surgery to remove the gallbladder (cholecystectomy). This is the most common treatment if all other treatments have not worked.  Take medicines to break up gallstones. Medicines may be used for up to 6–12 months.  Have an procedure to capture and remove gallstones.  Follow these instructions at home:  Medicines    Take over-the-counter and prescription medicines only as told by your health care provider.  If you were prescribed antibiotics, take them as told by your provider. Do not stop using the antibiotic even if you start to feel better.  Ask your provider if the medicine prescribed to you requires you to avoid driving or using machinery.  Eating and drinking    Drink enough fluid to keep your pee pale yellow. This is important during a gallbladder attack. Water and clear liquids are preferred.  Follow a healthy diet. This includes:  Reducing fatty foods, such as fried food and foods high in cholesterol.  Reducing refined carbohydrates, such as white bread and white rice.  Eating more fiber. Aim for foods such as almonds, fruit, and beans.  General instructions    Do not use any products that contain nicotine or tobacco. These products include cigarettes, chewing tobacco, and vaping devices, such as e-cigarettes. If you need help quitting, ask your provider.  Maintain a healthy weight.  Keep all follow-up visits. These may include seeing a specialist or a surgeon.  Where to find more information  National [place] of Diabetes and Digestive and Kidney Diseases: niddk.nih.gov  Contact a health care provider if:  You think you have had a gallbladder attack.  You have been diagnosed with silent gallstones and you develop indigestion or pain in your abdomen.  You have pain from a gallbladder attack that lasts for more than 2 hours.  You begin to have attacks more often.  You have nausea.  You have dark pee or light-colored poop.  Get help right away if:  You have pain in your abdomen that lasts for more than 5 hours or is getting worse.  You have a fever or chills.  You have vomiting that does not go away.  You develop jaundice.  This information is not intended to replace advice given to you by your health care provider. Make sure you discuss any questions you have with your health care provider.        First Trimester of Pregnancy  A pregnant person during the first trimester.  The first trimester of pregnancy starts on the first day of your last monthly period until the end of week 13. This is months 1 through 3 of pregnancy. A week after a sperm fertilizes an egg, the egg will implant into the wall of the uterus and begin to develop into a baby.    Body changes during your first trimester  Your body goes through many changes during pregnancy. The changes usually return to normal after your baby is born.    Physical changes    Your breasts may grow larger and may hurt. The area around your nipples may get darker.  Your periods will stop.  Your hair and nails may grow faster.  You may pee more often.  Health changes    You may tire easily.  Your gums may bleed and may be sensitive when you brush and floss.  You may not feel hungry.  You may have heartburn.  You may throw up or feel like you may throw up.  You may want to eat some foods, but not others.  You may have headaches.  You may have trouble pooping (constipation).  Other changes    Your emotions may change from day to day.  You may have more dreams.  Follow these instructions at home:  Medicines    Talk to your health care provider if you're taking medicines. Ask if the medicines are safe to take during pregnancy.  Your provider may change the medicines that you take.  Do not take any medicines unless told to by your provider.  Take a prenatal vitamin that has at least 600 micrograms (mcg) of folic acid.  Do not use herbal medicines, illegal substances, or medicines that are not approved by your provider.  Eating and drinking    While you're pregnant your body needs extra food for your growing baby. Talk with your provider about what to eat while pregnant.    Activity    Most women are able to exercise during pregnancy. Exercises may need to change as your pregnancy goes on.  Talk to your provider about your activities and exercise routines.  Relieving pain and discomfort    Wear a good, supportive bra if your breasts hurt.  Rest with your legs raised if you have leg cramps or low back pain.  Safety    Wear your seatbelt at all times when you're in a car.  Talk to your provider if someone hits you, hurts you, or yells at you.  Talk with your provider if you're feeling sad or have thoughts of hurting yourself.  Lifestyle    Certain things can be harmful while you're pregnant. Follow these rules:  Do not use hot tubs, steam rooms, or saunas.  Do not douche. Do not use tampons or scented pads.  Do not drink alcohol,smoke, vape, or use products with nicotine or tobacco in them. If you need help quitting, talk with your provider.  Avoid cat litter boxes and soil used by cats. These things carry germs that can cause harm to your pregnancy and your baby.  General instructions    Keep all follow-up visits. It helps you and your unborn baby stay as healthy as possible. Write down your questions. Take them to your visits. Your provider will:  Talk with you about your overall health.  Give you advice or refer you to specialists who can help with different needs, including:  Prenatal education classes.  Mental health and counseling.  Foods and healthy eating. Ask for help if you need help with food.  Call your dentist and ask to be seen. Brush your teeth with a soft toothbrush. Floss gently.  Where to find more information  American Pregnancy Association: americanpregnancy.org  American College of Obstetricians and Gynecologists: acog.org  Office on Women's Health: womenshealth.gov  Contact a health care provider if:  You feel dizzy, faint, or have a fever.  You vomit or have watery poop (diarrhea) for 2 days or more.  You have abnormal discharge or bleeding from your vagina.  You have pain when you pee or your pee smells bad.  You have cramps, pain, or pressure in your belly area.  Get help right away if:  You have trouble breathing or chest pain.  You have any kind of injury, such as from a fall or a car crash.  These symptoms may be an emergency. Get help right away. Call 911.  Do not wait to see if the symptoms will go away.  Do not drive yourself to the hospital.  This information is not intended to replace advice given to you by your health care provider. Make sure you discuss any questions you have with your health care provider.

## 2024-12-09 NOTE — ED PROVIDER NOTE - DISCHARGE DATE
Recommendations from today's MTM visit:                                                       Restart hydrochlorothiazide 1 pill once a day in the morning    Follow-up: 2 weeks with Mela    It was great to speak with you today.  I value your experience and would be very thankful for your time with providing feedback on our clinic survey. You may receive a survey via email or text message in the next few days.     To schedule another MTM appointment, please call the clinic directly or you may call the MTM scheduling line at [phone] or toll-free at [phone].     My Clinical Pharmacist's contact information:                                                      Please feel free to contact me with any questions or concerns you have.      Aide Barrientos, PharmD, BCACP   Medication Management Pharmacist   [facility]  [phone]     
09-Dec-2024

## 2024-12-09 NOTE — ED ADULT NURSE NOTE - NS ED NURSE RECORD ANOTHER VITAL SIGN
Keep cast clean and dry.  Nonweightbearing left lower extremity.  Contact office [phone] to arrange follow-up and report temperatures greater than 101 if they occur.    Jg may take tylenol (=acetaminophen) as a pill or liquid form as instructed for pain when needed every 4 hours.   Jg may take advil (=motrin =ibuprofen) as a pill or liquid form as instructed for pain when needed every 6 hours.     Encourage Jg to drink plenty of fluids and a healthy varied diet of fruits and vegetable. If he goes without a bowel movement greater than 1 day start to give Miralax daily until soft daily stool achieved.    Yes

## 2024-12-12 LAB
-  AMOXICILLIN/CLAVULANIC ACID: SIGNIFICANT CHANGE UP
-  AMPICILLIN/SULBACTAM: SIGNIFICANT CHANGE UP
-  AMPICILLIN: SIGNIFICANT CHANGE UP
-  AMPICILLIN: SIGNIFICANT CHANGE UP
-  AZTREONAM: SIGNIFICANT CHANGE UP
-  CEFAZOLIN: SIGNIFICANT CHANGE UP
-  CEFEPIME: SIGNIFICANT CHANGE UP
-  CEFOXITIN: SIGNIFICANT CHANGE UP
-  CEFTRIAXONE: SIGNIFICANT CHANGE UP
-  CEFUROXIME: SIGNIFICANT CHANGE UP
-  CIPROFLOXACIN: SIGNIFICANT CHANGE UP
-  CIPROFLOXACIN: SIGNIFICANT CHANGE UP
-  ERTAPENEM: SIGNIFICANT CHANGE UP
-  GENTAMICIN: SIGNIFICANT CHANGE UP
-  IMIPENEM: SIGNIFICANT CHANGE UP
-  LEVOFLOXACIN: SIGNIFICANT CHANGE UP
-  LEVOFLOXACIN: SIGNIFICANT CHANGE UP
-  MEROPENEM: SIGNIFICANT CHANGE UP
-  NITROFURANTOIN: SIGNIFICANT CHANGE UP
-  NITROFURANTOIN: SIGNIFICANT CHANGE UP
-  PIPERACILLIN/TAZOBACTAM: SIGNIFICANT CHANGE UP
-  TETRACYCLINE: SIGNIFICANT CHANGE UP
-  TOBRAMYCIN: SIGNIFICANT CHANGE UP
-  TRIMETHOPRIM/SULFAMETHOXAZOLE: SIGNIFICANT CHANGE UP
-  VANCOMYCIN: SIGNIFICANT CHANGE UP
CULTURE RESULTS: ABNORMAL
METHOD TYPE: SIGNIFICANT CHANGE UP
METHOD TYPE: SIGNIFICANT CHANGE UP
ORGANISM # SPEC MICROSCOPIC CNT: ABNORMAL
SPECIMEN SOURCE: SIGNIFICANT CHANGE UP

## 2024-12-16 PROBLEM — Z00.00 ENCOUNTER FOR PREVENTIVE HEALTH EXAMINATION: Status: ACTIVE | Noted: 2024-12-16

## 2024-12-27 ENCOUNTER — APPOINTMENT (OUTPATIENT)
Dept: OBGYN | Facility: CLINIC | Age: 34
End: 2024-12-27

## 2025-01-27 ENCOUNTER — APPOINTMENT (OUTPATIENT)
Dept: OBGYN | Facility: CLINIC | Age: 35
End: 2025-01-27
Payer: MEDICAID

## 2025-01-27 VITALS
HEART RATE: 86 BPM | SYSTOLIC BLOOD PRESSURE: 116 MMHG | RESPIRATION RATE: 14 BRPM | OXYGEN SATURATION: 99 % | HEIGHT: 64 IN | BODY MASS INDEX: 29.12 KG/M2 | WEIGHT: 170.6 LBS | DIASTOLIC BLOOD PRESSURE: 72 MMHG

## 2025-01-27 DIAGNOSIS — Z86.19 PERSONAL HISTORY OF OTHER INFECTIOUS AND PARASITIC DISEASES: ICD-10-CM

## 2025-01-27 DIAGNOSIS — Z01.419 ENCOUNTER FOR GYNECOLOGICAL EXAMINATION (GENERAL) (ROUTINE) W/OUT ABNORMAL FINDINGS: ICD-10-CM

## 2025-01-27 DIAGNOSIS — Z12.39 ENCOUNTER FOR OTHER SCREENING FOR MALIGNANT NEOPLASM OF BREAST: ICD-10-CM

## 2025-01-27 DIAGNOSIS — Z34.80 ENCOUNTER FOR SUPERVISION OF OTHER NORMAL PREGNANCY, UNSPECIFIED TRIMESTER: ICD-10-CM

## 2025-01-27 DIAGNOSIS — F19.11 OTHER PSYCHOACTIVE SUBSTANCE ABUSE, IN REMISSION: ICD-10-CM

## 2025-01-27 DIAGNOSIS — N91.1 SECONDARY AMENORRHEA: ICD-10-CM

## 2025-01-27 DIAGNOSIS — O21.0 MILD HYPEREMESIS GRAVIDARUM: ICD-10-CM

## 2025-01-27 DIAGNOSIS — O09.529 SUPERVISION OF ELDERLY MULTIGRAVIDA, UNSPECIFIED TRIMESTER: ICD-10-CM

## 2025-01-27 DIAGNOSIS — Z11.3 ENCOUNTER FOR SCREENING FOR INFECTIONS WITH A PREDOMINANTLY SEXUAL MODE OF TRANSMISSION: ICD-10-CM

## 2025-01-27 DIAGNOSIS — F19.20 OTHER PSYCHOACTIVE SUBSTANCE DEPENDENCE, UNCOMPLICATED: ICD-10-CM

## 2025-01-27 DIAGNOSIS — Z3A.11 11 WEEKS GESTATION OF PREGNANCY: ICD-10-CM

## 2025-01-27 DIAGNOSIS — Z12.4 ENCOUNTER FOR SCREENING FOR MALIGNANT NEOPLASM OF CERVIX: ICD-10-CM

## 2025-01-27 LAB
HCG UR QL: POSITIVE
QUALITY CONTROL: YES

## 2025-01-27 PROCEDURE — 99459 PELVIC EXAMINATION: CPT

## 2025-01-27 PROCEDURE — ZZZZZ: CPT

## 2025-01-27 PROCEDURE — 76830 TRANSVAGINAL US NON-OB: CPT

## 2025-01-27 PROCEDURE — 81025 URINE PREGNANCY TEST: CPT

## 2025-01-27 PROCEDURE — 99385 PREV VISIT NEW AGE 18-39: CPT | Mod: 25

## 2025-01-27 PROCEDURE — 76857 US EXAM PELVIC LIMITED: CPT

## 2025-01-27 RX ORDER — DOXYLAMINE SUCCINATE AND PYRIDOXINE HYDROCHLORIDE 10; 10 MG/1; MG/1
10-10 TABLET, DELAYED RELEASE ORAL
Qty: 120 | Refills: 1 | Status: ACTIVE | COMMUNITY
Start: 2025-01-27 | End: 1900-01-01

## 2025-01-28 LAB
25(OH)D3 SERPL-MCNC: 13.1 NG/ML
ABO + RH PNL BLD: NORMAL
BASOPHILS # BLD AUTO: 0.02 K/UL
BASOPHILS NFR BLD AUTO: 0.2 %
BLD GP AB SCN SERPL QL: NORMAL
C TRACH RRNA SPEC QL NAA+PROBE: NOT DETECTED
EOSINOPHIL # BLD AUTO: 0.18 K/UL
EOSINOPHIL NFR BLD AUTO: 1.4 %
FERRITIN SERPL-MCNC: 78 NG/ML
HCT VFR BLD CALC: 20.1 %
HGB A MFR BLD: 97.6 %
HGB A2 MFR BLD: 2.4 %
HGB BLD-MCNC: 6.7 G/DL
HGB FRACT BLD-IMP: NORMAL
HPV HIGH+LOW RISK DNA PNL CVX: NOT DETECTED
IMM GRANULOCYTES NFR BLD AUTO: 0.3 %
IRON SERPL-MCNC: 74 UG/DL
LYMPHOCYTES # BLD AUTO: 2.49 K/UL
LYMPHOCYTES NFR BLD AUTO: 18.8 %
MAN DIFF?: NORMAL
MCHC RBC-ENTMCNC: 31.8 PG
MCHC RBC-ENTMCNC: 33.3 G/DL
MCV RBC AUTO: 95.3 FL
MONOCYTES # BLD AUTO: 0.68 K/UL
MONOCYTES NFR BLD AUTO: 5.1 %
N GONORRHOEA RRNA SPEC QL NAA+PROBE: NOT DETECTED
NEUTROPHILS # BLD AUTO: 9.87 K/UL
NEUTROPHILS NFR BLD AUTO: 74.2 %
PLATELET # BLD AUTO: 401 K/UL
RBC # BLD: 2.11 M/UL
RBC # FLD: 12.1 %
SOURCE AMPLIFICATION: NORMAL
SOURCE TP AMPLIFICATION: NORMAL
T VAGINALIS RRNA SPEC QL NAA+PROBE: NOT DETECTED
TSH SERPL-ACNC: 2.49 UIU/ML
WBC # FLD AUTO: 13.28 K/UL

## 2025-01-29 DIAGNOSIS — D58.2 OTHER HEMOGLOBINOPATHIES: ICD-10-CM

## 2025-01-29 DIAGNOSIS — F11.10 OPIOID ABUSE, UNCOMPLICATED: ICD-10-CM

## 2025-01-29 DIAGNOSIS — F41.9 ANXIETY DISORDER, UNSPECIFIED: ICD-10-CM

## 2025-01-29 DIAGNOSIS — A74.9 CHLAMYDIAL INFECTION, UNSPECIFIED: ICD-10-CM

## 2025-01-29 DIAGNOSIS — F43.10 POST-TRAUMATIC STRESS DISORDER, UNSPECIFIED: ICD-10-CM

## 2025-01-29 DIAGNOSIS — K80.20 CALCULUS OF GALLBLADDER W/OUT CHOLECYSTITIS W/OUT OBSTRUCTION: ICD-10-CM

## 2025-01-29 LAB
B19V IGG SER QL IA: 0.18 INDEX
B19V IGG+IGM SER-IMP: NEGATIVE
B19V IGG+IGM SER-IMP: NORMAL
B19V IGM FLD-ACNC: 0.06 INDEX
B19V IGM SER-ACNC: NEGATIVE
HBV SURFACE AG SER QL: NONREACTIVE
HCV AB SER QL: NONREACTIVE
HCV S/CO RATIO: 0.14 S/CO
HIV1+2 AB SPEC QL IA.RAPID: NONREACTIVE
LEAD BLD-MCNC: <1 UG/DL
MEV IGG FLD QL IA: 79.8 AU/ML
MEV IGG+IGM SER-IMP: POSITIVE
RUBV IGG FLD-ACNC: 1.55 INDEX
RUBV IGG SER-IMP: POSITIVE
T GONDII AB SER-IMP: NEGATIVE
T GONDII AB SER-IMP: NEGATIVE
T GONDII IGG SER QL: <3 IU/ML
T GONDII IGM SER QL: <3 AU/ML
T PALLIDUM AB SER QL IA: NEGATIVE
VZV AB TITR SER: POSITIVE
VZV IGG SER IF-ACNC: 14.9 S/CO

## 2025-01-31 ENCOUNTER — NON-APPOINTMENT (OUTPATIENT)
Age: 35
End: 2025-01-31

## 2025-01-31 LAB — CYTOLOGY CVX/VAG DOC THIN PREP: ABNORMAL

## 2025-02-03 ENCOUNTER — NON-APPOINTMENT (OUTPATIENT)
Age: 35
End: 2025-02-03

## 2025-02-04 ENCOUNTER — ASOB RESULT (OUTPATIENT)
Age: 35
End: 2025-02-04

## 2025-02-04 ENCOUNTER — APPOINTMENT (OUTPATIENT)
Dept: ANTEPARTUM | Facility: CLINIC | Age: 35
End: 2025-02-04
Payer: MEDICAID

## 2025-02-04 ENCOUNTER — NON-APPOINTMENT (OUTPATIENT)
Age: 35
End: 2025-02-04

## 2025-02-04 ENCOUNTER — LABORATORY RESULT (OUTPATIENT)
Age: 35
End: 2025-02-04

## 2025-02-04 ENCOUNTER — APPOINTMENT (OUTPATIENT)
Dept: ANTEPARTUM | Facility: CLINIC | Age: 35
End: 2025-02-04

## 2025-02-04 DIAGNOSIS — O35.10X0 MATERNAL CARE FOR (SUSPECTED) CHROMOSOMAL ABNORMALITY IN FETUS, UNSPECIFIED, NOT APPLICABLE OR UNSPECIFIED: ICD-10-CM

## 2025-02-04 PROCEDURE — 76813 OB US NUCHAL MEAS 1 GEST: CPT

## 2025-02-04 PROCEDURE — 76801 OB US < 14 WKS SINGLE FETUS: CPT | Mod: 59

## 2025-02-05 ENCOUNTER — NON-APPOINTMENT (OUTPATIENT)
Age: 35
End: 2025-02-05

## 2025-02-10 ENCOUNTER — NON-APPOINTMENT (OUTPATIENT)
Age: 35
End: 2025-02-10

## 2025-02-11 LAB
BASOPHILS # BLD AUTO: 0.01 K/UL
BASOPHILS NFR BLD AUTO: 0.1 %
EOSINOPHIL # BLD AUTO: 0.2 K/UL
EOSINOPHIL NFR BLD AUTO: 2.3 %
HCT VFR BLD CALC: 32.6 %
HGB BLD-MCNC: 11.5 G/DL
IMM GRANULOCYTES NFR BLD AUTO: 0.3 %
LYMPHOCYTES # BLD AUTO: 1.38 K/UL
LYMPHOCYTES NFR BLD AUTO: 15.9 %
MAN DIFF?: NORMAL
MCHC RBC-ENTMCNC: 32.8 PG
MCHC RBC-ENTMCNC: 35.3 G/DL
MCV RBC AUTO: 92.9 FL
MONOCYTES # BLD AUTO: 0.47 K/UL
MONOCYTES NFR BLD AUTO: 5.4 %
NEUTROPHILS # BLD AUTO: 6.59 K/UL
NEUTROPHILS NFR BLD AUTO: 76 %
PLATELET # BLD AUTO: 256 K/UL
RBC # BLD: 3.51 M/UL
RBC # FLD: 12.6 %
WBC # FLD AUTO: 8.68 K/UL

## 2025-02-18 ENCOUNTER — NON-APPOINTMENT (OUTPATIENT)
Age: 35
End: 2025-02-18

## 2025-02-25 ENCOUNTER — NON-APPOINTMENT (OUTPATIENT)
Age: 35
End: 2025-02-25

## 2025-02-25 PROBLEM — Z3A.16 16 WEEKS GESTATION OF PREGNANCY: Status: ACTIVE | Noted: 2025-02-25 | Resolved: 2025-03-25

## 2025-02-26 ENCOUNTER — NON-APPOINTMENT (OUTPATIENT)
Age: 35
End: 2025-02-26

## 2025-02-26 ENCOUNTER — APPOINTMENT (OUTPATIENT)
Dept: OBGYN | Facility: CLINIC | Age: 35
End: 2025-02-26

## 2025-02-27 ENCOUNTER — EMERGENCY (EMERGENCY)
Facility: HOSPITAL | Age: 35
LOS: 1 days | Discharge: HOME | End: 2025-02-27
Attending: EMERGENCY MEDICINE | Admitting: EMERGENCY MEDICINE
Payer: MEDICAID

## 2025-02-27 VITALS
WEIGHT: 172.84 LBS | RESPIRATION RATE: 19 BRPM | TEMPERATURE: 98 F | OXYGEN SATURATION: 99 % | SYSTOLIC BLOOD PRESSURE: 102 MMHG | HEART RATE: 90 BPM | HEIGHT: 64 IN | DIASTOLIC BLOOD PRESSURE: 72 MMHG

## 2025-02-27 VITALS
RESPIRATION RATE: 16 BRPM | HEART RATE: 80 BPM | SYSTOLIC BLOOD PRESSURE: 111 MMHG | DIASTOLIC BLOOD PRESSURE: 76 MMHG | TEMPERATURE: 98 F | OXYGEN SATURATION: 98 %

## 2025-02-27 LAB
ALBUMIN SERPL ELPH-MCNC: 2.8 G/DL — LOW (ref 3.3–5)
ALP SERPL-CCNC: 59 U/L — SIGNIFICANT CHANGE UP (ref 30–120)
ALT FLD-CCNC: 14 U/L — SIGNIFICANT CHANGE UP (ref 10–60)
ANION GAP SERPL CALC-SCNC: 9 MMOL/L — SIGNIFICANT CHANGE UP (ref 5–17)
APPEARANCE UR: ABNORMAL
APTT BLD: 32.8 SEC — SIGNIFICANT CHANGE UP (ref 24.5–35.6)
AST SERPL-CCNC: 9 U/L — LOW (ref 10–40)
BACTERIA # UR AUTO: ABNORMAL /HPF
BASOPHILS # BLD AUTO: 0.01 K/UL — SIGNIFICANT CHANGE UP (ref 0–0.2)
BASOPHILS NFR BLD AUTO: 0.1 % — SIGNIFICANT CHANGE UP (ref 0–2)
BILIRUB SERPL-MCNC: 0.2 MG/DL — SIGNIFICANT CHANGE UP (ref 0.2–1.2)
BILIRUB UR-MCNC: ABNORMAL
BLD GP AB SCN SERPL QL: SIGNIFICANT CHANGE UP
BUN SERPL-MCNC: 6 MG/DL — LOW (ref 7–23)
CALCIUM SERPL-MCNC: 9.2 MG/DL — SIGNIFICANT CHANGE UP (ref 8.4–10.5)
CHLORIDE SERPL-SCNC: 102 MMOL/L — SIGNIFICANT CHANGE UP (ref 96–108)
CO2 SERPL-SCNC: 26 MMOL/L — SIGNIFICANT CHANGE UP (ref 22–31)
COD CRY URNS QL: PRESENT
COLOR SPEC: SIGNIFICANT CHANGE UP
CREAT SERPL-MCNC: 0.7 MG/DL — SIGNIFICANT CHANGE UP (ref 0.5–1.3)
DIFF PNL FLD: NEGATIVE — SIGNIFICANT CHANGE UP
EGFR: 115 ML/MIN/1.73M2 — SIGNIFICANT CHANGE UP
EOSINOPHIL # BLD AUTO: 0.12 K/UL — SIGNIFICANT CHANGE UP (ref 0–0.5)
EOSINOPHIL NFR BLD AUTO: 1.7 % — SIGNIFICANT CHANGE UP (ref 0–6)
EPI CELLS # UR: PRESENT
GLUCOSE SERPL-MCNC: 121 MG/DL — HIGH (ref 70–99)
GLUCOSE UR QL: NEGATIVE MG/DL — SIGNIFICANT CHANGE UP
HCT VFR BLD CALC: 33.5 % — LOW (ref 34.5–45)
HGB BLD-MCNC: 11.6 G/DL — SIGNIFICANT CHANGE UP (ref 11.5–15.5)
IMM GRANULOCYTES NFR BLD AUTO: 0.3 % — SIGNIFICANT CHANGE UP (ref 0–0.9)
INR BLD: 1.12 RATIO — SIGNIFICANT CHANGE UP (ref 0.85–1.16)
KETONES UR-MCNC: ABNORMAL MG/DL
LEUKOCYTE ESTERASE UR-ACNC: ABNORMAL
LIDOCAIN IGE QN: 18 U/L — SIGNIFICANT CHANGE UP (ref 16–77)
LYMPHOCYTES # BLD AUTO: 1.33 K/UL — SIGNIFICANT CHANGE UP (ref 1–3.3)
LYMPHOCYTES # BLD AUTO: 19 % — SIGNIFICANT CHANGE UP (ref 13–44)
MCHC RBC-ENTMCNC: 31.4 PG — SIGNIFICANT CHANGE UP (ref 27–34)
MCHC RBC-ENTMCNC: 34.6 G/DL — SIGNIFICANT CHANGE UP (ref 32–36)
MCV RBC AUTO: 90.8 FL — SIGNIFICANT CHANGE UP (ref 80–100)
MONOCYTES # BLD AUTO: 0.35 K/UL — SIGNIFICANT CHANGE UP (ref 0–0.9)
MONOCYTES NFR BLD AUTO: 5 % — SIGNIFICANT CHANGE UP (ref 2–14)
NEUTROPHILS # BLD AUTO: 5.18 K/UL — SIGNIFICANT CHANGE UP (ref 1.8–7.4)
NEUTROPHILS NFR BLD AUTO: 73.9 % — SIGNIFICANT CHANGE UP (ref 43–77)
NITRITE UR-MCNC: NEGATIVE — SIGNIFICANT CHANGE UP
NRBC BLD AUTO-RTO: 0 /100 WBCS — SIGNIFICANT CHANGE UP (ref 0–0)
OB PNL STL: NEGATIVE — SIGNIFICANT CHANGE UP
PH UR: 6 — SIGNIFICANT CHANGE UP (ref 5–8)
PLATELET # BLD AUTO: 221 K/UL — SIGNIFICANT CHANGE UP (ref 150–400)
POTASSIUM SERPL-MCNC: 3.3 MMOL/L — LOW (ref 3.5–5.3)
POTASSIUM SERPL-SCNC: 3.3 MMOL/L — LOW (ref 3.5–5.3)
PROT SERPL-MCNC: 6.7 G/DL — SIGNIFICANT CHANGE UP (ref 6–8.3)
PROT UR-MCNC: 30 MG/DL
PROTHROM AB SERPL-ACNC: 13.2 SEC — SIGNIFICANT CHANGE UP (ref 9.9–13.4)
RBC # BLD: 3.69 M/UL — LOW (ref 3.8–5.2)
RBC # FLD: 12.1 % — SIGNIFICANT CHANGE UP (ref 10.3–14.5)
RBC CASTS # UR COMP ASSIST: 2 /HPF — SIGNIFICANT CHANGE UP (ref 0–4)
SODIUM SERPL-SCNC: 137 MMOL/L — SIGNIFICANT CHANGE UP (ref 135–145)
SP GR SPEC: 1.03 — SIGNIFICANT CHANGE UP (ref 1–1.03)
UROBILINOGEN FLD QL: 2 MG/DL (ref 0.2–1)
WBC # BLD: 7.01 K/UL — SIGNIFICANT CHANGE UP (ref 3.8–10.5)
WBC # FLD AUTO: 7.01 K/UL — SIGNIFICANT CHANGE UP (ref 3.8–10.5)
WBC UR QL: 30 /HPF — HIGH (ref 0–5)

## 2025-02-27 PROCEDURE — 76810 OB US >/= 14 WKS ADDL FETUS: CPT

## 2025-02-27 PROCEDURE — 83690 ASSAY OF LIPASE: CPT

## 2025-02-27 PROCEDURE — 81001 URINALYSIS AUTO W/SCOPE: CPT

## 2025-02-27 PROCEDURE — 85610 PROTHROMBIN TIME: CPT

## 2025-02-27 PROCEDURE — 87077 CULTURE AEROBIC IDENTIFY: CPT

## 2025-02-27 PROCEDURE — 85025 COMPLETE CBC W/AUTO DIFF WBC: CPT

## 2025-02-27 PROCEDURE — 76805 OB US >/= 14 WKS SNGL FETUS: CPT | Mod: 26

## 2025-02-27 PROCEDURE — 82272 OCCULT BLD FECES 1-3 TESTS: CPT

## 2025-02-27 PROCEDURE — 99284 EMERGENCY DEPT VISIT MOD MDM: CPT

## 2025-02-27 PROCEDURE — 86901 BLOOD TYPING SEROLOGIC RH(D): CPT

## 2025-02-27 PROCEDURE — 86900 BLOOD TYPING SEROLOGIC ABO: CPT

## 2025-02-27 PROCEDURE — 87086 URINE CULTURE/COLONY COUNT: CPT

## 2025-02-27 PROCEDURE — 86850 RBC ANTIBODY SCREEN: CPT

## 2025-02-27 PROCEDURE — 36415 COLL VENOUS BLD VENIPUNCTURE: CPT

## 2025-02-27 PROCEDURE — 85730 THROMBOPLASTIN TIME PARTIAL: CPT

## 2025-02-27 PROCEDURE — 76805 OB US >/= 14 WKS SNGL FETUS: CPT

## 2025-02-27 PROCEDURE — 99284 EMERGENCY DEPT VISIT MOD MDM: CPT | Mod: 25

## 2025-02-27 PROCEDURE — 80053 COMPREHEN METABOLIC PANEL: CPT

## 2025-02-27 RX ORDER — ONDANSETRON HCL/PF 4 MG/2 ML
1 VIAL (ML) INJECTION
Qty: 1 | Refills: 0
Start: 2025-02-27

## 2025-02-27 NOTE — ED PROVIDER NOTE - PATIENT PORTAL LINK FT
You can access the FollowMyHealth Patient Portal offered by [facility] by registering at the following website: [email or web address]. By joining [facility]’s FollowMyHealth portal, you will also be able to view your health information using other applications (apps) compatible with our system.

## 2025-02-27 NOTE — ED PROVIDER NOTE - CLINICAL SUMMARY MEDICAL DECISION MAKING FREE TEXT BOX
Patient with 3 episodes of vomiting with small amount of blood each time.  Patient has been vomiting a lot during the pregnancy and was given a medication (Diclegis?) but does not like the effects.  Will check labs.  If no significant anemia and patient has no melanotic stool can follow-up as outpatient.

## 2025-02-27 NOTE — ED ADULT NURSE NOTE - OBJECTIVE STATEMENT
patient is 16 weeks pregnant, reports 4 episodes of blood in vomit over the last 2 days. tonight with lower abdominal pain which prompted her to come to ED for eval.

## 2025-02-27 NOTE — ED PROVIDER NOTE - CARE PROVIDER_API CALL
Patient called back stated that her  did not relay the message from yesterday and that she di not take the 4.5 mg dose as instructed.    Instructed patient to take scheduled 4.5 mg today then take another 4.5 mg booster dose tomorrow due to 2 missed doses in the past week. Patient cannot confirm the exact dates at this time.    INR check in 1-2 weeks- appointment made.    Patti Lindsey RN     your ob/gyn,   Phone: (   )    -  Fax: (   )    -  Follow Up Time: 1-3 Days

## 2025-02-27 NOTE — ED PROVIDER NOTE - NSFOLLOWUPINSTRUCTIONS_ED_ALL_ED_FT
Síndrome viral    LO QUE NECESITA SABER:    ¿Qué es el síndrome viral?El síndrome viral es un término usado para los síntomas de tanja infección causada por un virus. Los virus se propagan fácilmente de tanja persona a otra mediante los objetos que se comparten.    ¿Cuáles son los signos y síntomas del síndrome viral?Los signos y síntomas podrían empezar de manera lenta o repentina y durar desde horas hasta jerri. Pueden ser de leves a graves y pueden cambiar en un periodo de días u horas. Puede presentar cualquiera de los siguientes signos o síntomas:    Fiebre y escalofríos    Congestión o goteo nasal    Tos, dolor de garganta y voz ronca    Dolor de dajuan, o dolor y presión alrededor de kallie ojos    Dolor muscular y de las articulaciones    Falta de aliento o sibilancia    Dolor abdominal, calambres y diarrea    Náuseas, vómitos o pérdida del apetito  ¿Cómo se diagnostica y trata el síndrome viral?Lloyd médico le preguntará acerca de kallie síntomas y lo examinará. Para tanja infección viral, no se administran antibióticos. Lo siguiente podría ayudarlo a sentirse mejor:    Acetaminofénalivia el dolor y baja la fiebre. Está disponible sin receta médica. Pregunte la cantidad y la frecuencia con que debe tomarlos. Siga las indicaciones. Phillip las etiquetas de todos los demás medicamentos que esté usando para saber si también contienen acetaminofén, o pregunte a lloyd médico o farmacéutico. El acetaminofén puede causar daño en el hígado cuando no se milind de forma correcta.    AINEcomo el ibuprofeno, ayudan a disminuir la inflamación, el dolor y la fiebre. Los DEMIAN pueden causar sangrado estomacal o problemas renales en ciertas personas. Si usted milind un medicamento anticoagulante, siempre pregúntele a lloyd médico si los DEMIAN son seguros para usted. Siempre phillip la etiqueta de yakelin medicamento y siga las instrucciones.    El medicamento para el resfriadoayuda a disminuir la inflamación, a controlar la tos y a aliviar la congestión del pecho o nasal.    El rociador nasal de agua salinaayuda a aliviar la congestión de los senos paranasales.  ¿Cómo puedo controlar los síntomas?    [place] líquidos cheyenne se le indique para evitar tanja deshidratación.Pregunte cuánto líquido debe lizbet cada día y cuáles líquidos son los más adecuados para usted. No tome líquidos con cafeína. La cafeína puede empeorar la deshidratación.    Descanse lo suficiente para ayudar a que lloyd cuerpo sane.[place] siestas jaylin el día. Pregunte a lloyd médico cuándo puede regresar a lloyd trabajo y a kallie actividades cotidianas.    Use un humidificador de jeanette fríopara aumentar el nivel de humedad en el aire de lloyd hogar. Warner podría ayudarle a respirar más fácilmente y al mismo tiempo disminuir la tos.    Sue té caliente con miel o use pastillas para la tos para el dolor de garganta.Los caramelos para la tos están disponibles sin receta médica. Siga las indicaciones para lizbet los caramelos para la tos.    No fume ni esté cerca a alguien que esté fumando.La nicotina y otras sustancias químicas que contienen los cigarrillos y cigarros pueden dañar los pulmones. El fumar también puede retrasar la sanación. Pida información a lloyd médico si usted actualmente fuma y necesita ayuda para dejar de fumar. Los cigarrillos electrónicos o el tabaco sin humo igualmente contienen nicotina. Consulte con lloyd médico antes de utilizar estos productos.  ¿Qué puedo hacer para prevenir la propagación de los gérmenes?    Lávese las yuriy con frecuencia jaylin el día.Utilice agua y jabón. Frótese las yuriy enjabonadas, entrelazando los dedos, jaylin al menos 20 segundos. Enjuáguese con agua corriente caliente. Séquese las yuriy con tanja toalla limpia o tanja toalla de papel. Puede usar un desinfectante para yuriy que contenga alcohol, si no hay agua y jabón disponibles.  Lavado de yuriy      Cúbrase al toser y estornudar.Gire la victor m y cúbrase la boca y la nariz con un pañuelo. Deseche el pañuelo. Use el ángulo del brazo si no tiene un pañuelo disponible. Luego lávese las yuriy con agua y jabón o use un desinfectante de yuriy.    Quédese en casa mientras esté enfermo.Evite las multitudes lo más que pueda.    Vacúnese contra la influenza (gripe) tan pronto [place] se recomiende cada año.La vacuna antigripal se ofrece a partir de septiembre u octubre. Pregunte a lloyd médico acerca de la vacuna contra la neumonía. Esta vacuna generalmente se recomienda cada 5 años en los adultos mayores.    Llame al número local de emergencias (911 en los [place]), o pídale a alguien que llame si:    Usted sufre tanja convulsión.    No es posible despertarlo.    Usted tiene dolor torácico y dificultad para respirar.  ¿Cuándo denys buscar atención inmediata?    Usted tiene rigidez en el shannon, dolor de dajuan intenso y sensibilidad a la sarita.    Usted se siente débil, mareado o confundido.    Usted estelle de orinar u orina menos de lo habitual.    Usted tose adelita o tanja mucosidad espesa amarilla o chana.    Usted tiene dolor abdominal severo o lloyd abdomen está más radames de lo habitual.  ¿Cuándo denys llamar a mi médico?    Kallie síntomas no mejoran con el tratamiento o empeoran después de 3 días.    Usted tiene salpullido o dolor de oído.    Usted siente ardor al orinar.    Usted tiene preguntas o inquietudes acerca de lloyd condición o cuidado.  ACUERDOS SOBRE LLOYD CUIDADO:    Usted tiene el derecho de ayudar a planear lloyd cuidado. Aprenda todo lo que pueda sobre lloyd condición y [place] darle tratamiento. Discuta kallie opciones de tratamiento con kallie médicos para decidir el cuidado que usted desea recibir. Usted siempre tiene el derecho de rechazar el tratamiento. Nausea and Vomiting in Pregnancy    WHAT YOU NEED TO KNOW:    What do I need to know about nausea and vomiting in pregnancy? Nausea and vomiting can happen any time of day. These symptoms usually start before the 9th week of pregnancy, and end by the 14th week (second trimester). Some women can have nausea and vomiting for a longer time. These symptoms can make it hard for you to do your daily activities.    What increases my risk for nausea and vomiting in pregnancy?    Being pregnant with more than one baby    Nausea and vomiting in a past pregnancy    Having a sister or mother who had nausea and vomiting during pregnancy    History of migraine headaches or motion sickness    Being pregnant with a female baby  How is nausea and vomiting in pregnancy treated? Treatment for nausea and vomiting in pregnancy is usually not needed. You can make changes in the foods you eat and in your activities to help manage your symptoms. You may need to try several things to learn what works for you. Talk to your healthcare provider if your symptoms do not decrease with the changes suggested below.    What nutrition changes can I make to manage nausea and vomiting?    Eat smaller meals, more often. Eat a small snack, such as crackers, dry cereal, or a small sandwich before you go to bed.    Eat some crackers or dry toast before you get out of bed in the morning. Get out of bed slowly. Sudden movements could cause you to get dizzy and nauseated.    Eat bland foods when you feel nauseated. Examples of bland foods include dry toast, dry cereal, plain pasta, white rice, and bread. Other bland foods include saltine crackers, bananas, gelatin, and pretzels. Avoid spicy, greasy, and fried foods.    Drink liquids that contain ginger. Drink ginger ale made with real ginger or ginger tea made with fresh grated ginger. Ginger capsules or ginger candies may also help to decrease nausea and vomiting.    Drink liquids between meals instead of with meals. Wait at least 30 minutes after you eat to drink liquids. Drink small amounts of liquids often throughout the day to prevent dehydration. Ask how much liquid you should drink each day.  What other changes can I make to manage nausea and vomiting?    Avoid smells that bother you. Strong odors may cause nausea and vomiting to start, or make it worse.    Do not brush your teeth right after you eat if it makes you nauseated.    Rest when you need to. Start activity slowly and work up to your usual routine as you start to feel better.    Talk to your healthcare provider about your prenatal vitamins. Prenatal vitamins can cause nausea for some women. Try taking your prenatal vitamin at night or with a snack. If this change does not help, your healthcare provider may recommend a different type of vitamin.    Light to moderate exercise may help to decrease your symptoms. It may also help you to sleep better at night. Ask your healthcare provider about the best exercise plan for you.  When should I seek immediate care?    You are dizzy, cold, and thirsty, and your eyes and mouth are dry.    You are urinating very little or not at all.    You are dizzy or lightheaded when you stand up.    You see blood or material that looks like coffee grounds in your vomit.  When should I call my doctor?    You vomit more than 4 times in 1 day.    You have not been able to keep liquids down for more than 1 day.    You lose more than 2 pounds.    You have a fever.    Your nausea and vomiting continue longer than 14 weeks.    You have questions or concerns about your condition or care.  CARE AGREEMENT:    You have the right to help plan your care. Learn about your health condition and how it may be treated. Discuss treatment options with your healthcare providers to decide what care you want to receive. You always have the right to refuse treatment.

## 2025-02-27 NOTE — ED PROVIDER NOTE - OBJECTIVE STATEMENT
Patient presents to emergency department stating that she has thrown up blood 3 times in the last 2 days.  Last episode was just prior to coming to the emergency department.  Patient estimates approximately a teaspoon of blood each time.  Patient is approximately 16 weeks pregnant with a known single IUP.  Patient is  3 para 2.  No significant abdominal pain though patient says her gallbladder has been acting up a little from time to time.  Patient has been vomiting a lot during the pregnancy and was given a medication for it but states the medication makes her drowsy and she does not like using it.  No change in her bowel movements.  No black stools or tarry stool.  No blood in her bowel movements.  Patient has been feeling a little lightheaded for the last couple of days.  No history of ulcers or gastritis in the past.

## 2025-02-27 NOTE — ED PROVIDER NOTE - PRO INTERPRETER NEED 2
[FreeTextEntry1] : # Metastatic moderate to poorly differentiated adenocarcinoma , dx 07/2022 \par - BRCA (-) \par - s/p laparoscopy on 8.22.2022 with findings of ~1L malignant ascites and peritoneal implants \par - Ca19-9 is 6481, CEA 6.8 from 07/2022 \par - reviewed radiology, pathology and lab workup and had a discussion regarding implications of diagnosis, prognosis and options for management including but not limited to chemotherapy for palliative intent ; had a lengthy discussion regarding Stage IV disease and not curative \par - s/p L Chest port placement with SURG, Dr. Morgan \par - CT CAP from 11.8.2022 is essentially stable, tumor marker is decreasing \par - s/p cycle 12 of 5FU/Leucovorin/Irinotecan on 2/24 , initiated on 9.6.2022 ; discontinued oxaliplatin beginning C12 due to neuropathy \par - STOP chemotherapy (5FU/Leucovorin/Irinotecan) due to rising Ca19-9 and PET/CT findings\par - PET/CT 3.9.23 showed FDG uptake within upper abdominal peritoneal nodularity adjacent to the stomach and pancreas, pancreatic tail, and a subcentimeter right pelvic sidewall lymph node.  \par - Discussed risks of chemotherapy using Gemcitabine + Abraxane including but not limited to fatigue, nausea, vomiting, diarrhea, elevated liver enzymes, loss of appetite, mucositis, hair loss, lower extremity edema, allergic reactions, cytopenias, and infection to name a few.  Written information provided.  Anti-emetics prescribed to pharmacy on file.  Consent obtained.  \par \par # Peripheral neuropathy, likely due to chemotherapy and hx of DM \par - we will omit oxaliplatin beginning C12 \par - c/w gabapentin 600mg daily , initiated 01/2023 \par \par # H. Pylori infection , dx 07/2022 \par - s/p upper EGD in 07/2022 ; completed antibiotic treatment \par - followup with GI, Dr. Maximilian Larson, as scheduled for management \par \par RTC with C1D8 with SMART NP with CBC, BMP, LFTs Ca19-9 prior \par \par seen/examined w/ NP C.[facility]; note reviewed; case discussed\par didscussed with radiology PET result: peritoneal involvement\par ca 19-9 doubled in 2 weeks\par no response to FOLFIRINOX\par besises he does not tolerate oxaliplatin\par recommend to change chemo: the regimen advised is gemzar/abraxane\par repeat pet scan 3 mos after start of treatment\par discussion time is 45 mins English

## 2025-02-28 ENCOUNTER — NON-APPOINTMENT (OUTPATIENT)
Age: 35
End: 2025-02-28

## 2025-03-01 LAB
CULTURE RESULTS: ABNORMAL
SPECIMEN SOURCE: SIGNIFICANT CHANGE UP

## 2025-03-04 ENCOUNTER — APPOINTMENT (OUTPATIENT)
Dept: OBGYN | Facility: CLINIC | Age: 35
End: 2025-03-04
Payer: MEDICAID

## 2025-03-04 VITALS
BODY MASS INDEX: 28.21 KG/M2 | HEIGHT: 64 IN | SYSTOLIC BLOOD PRESSURE: 90 MMHG | OXYGEN SATURATION: 97 % | DIASTOLIC BLOOD PRESSURE: 60 MMHG | HEART RATE: 89 BPM | RESPIRATION RATE: 14 BRPM | WEIGHT: 165.25 LBS

## 2025-03-04 DIAGNOSIS — Z3A.17 17 WEEKS GESTATION OF PREGNANCY: ICD-10-CM

## 2025-03-04 DIAGNOSIS — O21.0 MILD HYPEREMESIS GRAVIDARUM: ICD-10-CM

## 2025-03-04 DIAGNOSIS — D58.2 OTHER HEMOGLOBINOPATHIES: ICD-10-CM

## 2025-03-04 DIAGNOSIS — N91.1 SECONDARY AMENORRHEA: ICD-10-CM

## 2025-03-04 DIAGNOSIS — O35.10X0 MATERNAL CARE FOR (SUSPECTED) CHROMOSOMAL ABNORMALITY IN FETUS, UNSPECIFIED, NOT APPLICABLE OR UNSPECIFIED: ICD-10-CM

## 2025-03-04 DIAGNOSIS — Z3A.16 16 WEEKS GESTATION OF PREGNANCY: ICD-10-CM

## 2025-03-04 DIAGNOSIS — O09.90 SUPERVISION OF HIGH RISK PREGNANCY, UNSPECIFIED, UNSPECIFIED TRIMESTER: ICD-10-CM

## 2025-03-04 DIAGNOSIS — F19.11 OTHER PSYCHOACTIVE SUBSTANCE ABUSE, IN REMISSION: ICD-10-CM

## 2025-03-04 PROCEDURE — 99213 OFFICE O/P EST LOW 20 MIN: CPT | Mod: TH,25

## 2025-03-04 RX ORDER — ONDANSETRON 8 MG/1
8 TABLET, ORALLY DISINTEGRATING ORAL 3 TIMES DAILY
Qty: 12 | Refills: 1 | Status: ACTIVE | COMMUNITY
Start: 2025-03-04 | End: 1900-01-01

## 2025-03-05 LAB
AFP INTERP SERPL-IMP: NORMAL
AFP INTERP SERPL-IMP: NORMAL
AFP MOM CUT-OFF: 2.5
AFP MOM SERPL: 1.35
AFP PERCENTILE: 82.2
AFP SERPL-ACNC: 45.91 NG/ML
ALBUMIN SERPL ELPH-MCNC: 4.4 G/DL
ALP BLD-CCNC: 66 U/L
ALT SERPL-CCNC: 6 U/L
ANION GAP SERPL CALC-SCNC: 16 MMOL/L
AST SERPL-CCNC: 12 U/L
BILIRUB SERPL-MCNC: 0.3 MG/DL
BUN SERPL-MCNC: 4 MG/DL
CALCIUM SERPL-MCNC: 10.2 MG/DL
CARBAMAZEPINE?: NO
CHLORIDE SERPL-SCNC: 99 MMOL/L
CO2 SERPL-SCNC: 23 MMOL/L
CREAT SERPL-MCNC: 0.62 MG/DL
CURRENT SMOKER: NO
DIABETES STATUS PATIENT: NO
EGFRCR SERPLBLD CKD-EPI 2021: 118 ML/MIN/1.73M2
GA: NORMAL
GESTATIONAL AGE METHOD: NORMAL
GLUCOSE SERPL-MCNC: 99 MG/DL
HX OF NTD NARR: NO
MULTIPLE PREGNANCY: NORMAL
NEURAL TUBE DEFECT RISK FETUS: NORMAL
NEURAL TUBE DEFECT RISK POP: NORMAL
POTASSIUM SERPL-SCNC: 3.4 MMOL/L
PROT SERPL-MCNC: 7.3 G/DL
RECOM F/U: NO
SODIUM SERPL-SCNC: 138 MMOL/L
TEST PERFORMANCE INFO SPEC: NORMAL
VALPROIC ACID?: NO

## 2025-03-06 DIAGNOSIS — O99.810 ABNORMAL GLUCOSE COMPLICATING PREGNANCY: ICD-10-CM

## 2025-03-06 DIAGNOSIS — O09.529 SUPERVISION OF ELDERLY MULTIGRAVIDA, UNSPECIFIED TRIMESTER: ICD-10-CM

## 2025-03-06 DIAGNOSIS — E87.6 HYPOKALEMIA: ICD-10-CM

## 2025-03-07 DIAGNOSIS — Z22.338 CARRIER OF OTHER STREPTOCOCCUS: ICD-10-CM

## 2025-03-07 DIAGNOSIS — B95.5 UNSPECIFIED INFECTION OF URINARY TRACT IN PREGNANCY, UNSPECIFIED TRIMESTER: ICD-10-CM

## 2025-03-07 DIAGNOSIS — O23.40 UNSPECIFIED INFECTION OF URINARY TRACT IN PREGNANCY, UNSPECIFIED TRIMESTER: ICD-10-CM

## 2025-03-07 LAB — BACTERIA UR CULT: ABNORMAL

## 2025-03-07 RX ORDER — CEFUROXIME AXETIL 250 MG/1
250 TABLET ORAL
Qty: 14 | Refills: 1 | Status: ACTIVE | COMMUNITY
Start: 2025-03-07 | End: 1900-01-01

## 2025-03-11 ENCOUNTER — EMERGENCY (EMERGENCY)
Facility: HOSPITAL | Age: 35
LOS: 1 days | Discharge: HOME | End: 2025-03-11
Attending: EMERGENCY MEDICINE | Admitting: EMERGENCY MEDICINE
Payer: MEDICAID

## 2025-03-11 VITALS
DIASTOLIC BLOOD PRESSURE: 72 MMHG | SYSTOLIC BLOOD PRESSURE: 105 MMHG | OXYGEN SATURATION: 97 % | RESPIRATION RATE: 16 BRPM | HEART RATE: 87 BPM | TEMPERATURE: 98 F

## 2025-03-11 VITALS
OXYGEN SATURATION: 98 % | SYSTOLIC BLOOD PRESSURE: 100 MMHG | DIASTOLIC BLOOD PRESSURE: 68 MMHG | WEIGHT: 169.76 LBS | TEMPERATURE: 98 F | HEART RATE: 80 BPM | HEIGHT: 64 IN | RESPIRATION RATE: 18 BRPM

## 2025-03-11 LAB
ALBUMIN SERPL ELPH-MCNC: 2.6 G/DL — LOW (ref 3.3–5)
ALP SERPL-CCNC: 55 U/L — SIGNIFICANT CHANGE UP (ref 30–120)
ALT FLD-CCNC: 16 U/L — SIGNIFICANT CHANGE UP (ref 10–60)
ANION GAP SERPL CALC-SCNC: 5 MMOL/L — SIGNIFICANT CHANGE UP (ref 5–17)
AST SERPL-CCNC: 9 U/L — LOW (ref 10–40)
BASOPHILS # BLD AUTO: 0.02 K/UL — SIGNIFICANT CHANGE UP (ref 0–0.2)
BASOPHILS NFR BLD AUTO: 0.3 % — SIGNIFICANT CHANGE UP (ref 0–2)
BILIRUB SERPL-MCNC: 0.2 MG/DL — SIGNIFICANT CHANGE UP (ref 0.2–1.2)
BUN SERPL-MCNC: 2 MG/DL — LOW (ref 7–23)
CALCIUM SERPL-MCNC: 8.6 MG/DL — SIGNIFICANT CHANGE UP (ref 8.4–10.5)
CHLORIDE SERPL-SCNC: 104 MMOL/L — SIGNIFICANT CHANGE UP (ref 96–108)
CO2 SERPL-SCNC: 29 MMOL/L — SIGNIFICANT CHANGE UP (ref 22–31)
CREAT SERPL-MCNC: 0.49 MG/DL — LOW (ref 0.5–1.3)
D DIMER BLD IA.RAPID-MCNC: 1916 NG/ML DDU — HIGH
EGFR: 125 ML/MIN/1.73M2 — SIGNIFICANT CHANGE UP
EOSINOPHIL # BLD AUTO: 0.2 K/UL — SIGNIFICANT CHANGE UP (ref 0–0.5)
EOSINOPHIL NFR BLD AUTO: 2.9 % — SIGNIFICANT CHANGE UP (ref 0–6)
FLUAV AG NPH QL: SIGNIFICANT CHANGE UP
FLUBV AG NPH QL: SIGNIFICANT CHANGE UP
GLUCOSE SERPL-MCNC: 95 MG/DL — SIGNIFICANT CHANGE UP (ref 70–99)
HCG UR QL: POSITIVE
HCT VFR BLD CALC: 30.6 % — LOW (ref 34.5–45)
HGB BLD-MCNC: 10.6 G/DL — LOW (ref 11.5–15.5)
IMM GRANULOCYTES NFR BLD AUTO: 0.1 % — SIGNIFICANT CHANGE UP (ref 0–0.9)
LIDOCAIN IGE QN: 12 U/L — LOW (ref 16–77)
LYMPHOCYTES # BLD AUTO: 1.1 K/UL — SIGNIFICANT CHANGE UP (ref 1–3.3)
LYMPHOCYTES # BLD AUTO: 15.9 % — SIGNIFICANT CHANGE UP (ref 13–44)
MCHC RBC-ENTMCNC: 31.8 PG — SIGNIFICANT CHANGE UP (ref 27–34)
MCHC RBC-ENTMCNC: 34.6 G/DL — SIGNIFICANT CHANGE UP (ref 32–36)
MCV RBC AUTO: 91.9 FL — SIGNIFICANT CHANGE UP (ref 80–100)
MONOCYTES # BLD AUTO: 0.46 K/UL — SIGNIFICANT CHANGE UP (ref 0–0.9)
MONOCYTES NFR BLD AUTO: 6.6 % — SIGNIFICANT CHANGE UP (ref 2–14)
NEUTROPHILS # BLD AUTO: 5.13 K/UL — SIGNIFICANT CHANGE UP (ref 1.8–7.4)
NEUTROPHILS NFR BLD AUTO: 74.2 % — SIGNIFICANT CHANGE UP (ref 43–77)
NRBC BLD AUTO-RTO: 0 /100 WBCS — SIGNIFICANT CHANGE UP (ref 0–0)
NT-PROBNP SERPL-SCNC: 32 PG/ML — SIGNIFICANT CHANGE UP (ref 0–125)
PLATELET # BLD AUTO: 220 K/UL — SIGNIFICANT CHANGE UP (ref 150–400)
POTASSIUM SERPL-MCNC: 3 MMOL/L — LOW (ref 3.5–5.3)
POTASSIUM SERPL-SCNC: 3 MMOL/L — LOW (ref 3.5–5.3)
PROT SERPL-MCNC: 5.9 G/DL — LOW (ref 6–8.3)
RBC # BLD: 3.33 M/UL — LOW (ref 3.8–5.2)
RBC # FLD: 12.4 % — SIGNIFICANT CHANGE UP (ref 10.3–14.5)
RSV RNA NPH QL NAA+NON-PROBE: SIGNIFICANT CHANGE UP
SARS-COV-2 RNA SPEC QL NAA+PROBE: SIGNIFICANT CHANGE UP
SODIUM SERPL-SCNC: 138 MMOL/L — SIGNIFICANT CHANGE UP (ref 135–145)
TROPONIN I, HIGH SENSITIVITY RESULT: <4 NG/L — SIGNIFICANT CHANGE UP
WBC # BLD: 6.92 K/UL — SIGNIFICANT CHANGE UP (ref 3.8–10.5)
WBC # FLD AUTO: 6.92 K/UL — SIGNIFICANT CHANGE UP (ref 3.8–10.5)

## 2025-03-11 PROCEDURE — 96374 THER/PROPH/DIAG INJ IV PUSH: CPT | Mod: XU

## 2025-03-11 PROCEDURE — 93970 EXTREMITY STUDY: CPT

## 2025-03-11 PROCEDURE — 80053 COMPREHEN METABOLIC PANEL: CPT

## 2025-03-11 PROCEDURE — 96361 HYDRATE IV INFUSION ADD-ON: CPT

## 2025-03-11 PROCEDURE — 99285 EMERGENCY DEPT VISIT HI MDM: CPT | Mod: 25

## 2025-03-11 PROCEDURE — 81025 URINE PREGNANCY TEST: CPT

## 2025-03-11 PROCEDURE — 76805 OB US >/= 14 WKS SNGL FETUS: CPT | Mod: 26

## 2025-03-11 PROCEDURE — 87637 SARSCOV2&INF A&B&RSV AMP PRB: CPT

## 2025-03-11 PROCEDURE — 93970 EXTREMITY STUDY: CPT | Mod: 26

## 2025-03-11 PROCEDURE — 71275 CT ANGIOGRAPHY CHEST: CPT | Mod: 26

## 2025-03-11 PROCEDURE — 36415 COLL VENOUS BLD VENIPUNCTURE: CPT

## 2025-03-11 PROCEDURE — 83880 ASSAY OF NATRIURETIC PEPTIDE: CPT

## 2025-03-11 PROCEDURE — 71045 X-RAY EXAM CHEST 1 VIEW: CPT | Mod: 26

## 2025-03-11 PROCEDURE — 76805 OB US >/= 14 WKS SNGL FETUS: CPT

## 2025-03-11 PROCEDURE — 85025 COMPLETE CBC W/AUTO DIFF WBC: CPT

## 2025-03-11 PROCEDURE — 71275 CT ANGIOGRAPHY CHEST: CPT | Mod: MC

## 2025-03-11 PROCEDURE — 85379 FIBRIN DEGRADATION QUANT: CPT

## 2025-03-11 PROCEDURE — 93005 ELECTROCARDIOGRAM TRACING: CPT

## 2025-03-11 PROCEDURE — 93010 ELECTROCARDIOGRAM REPORT: CPT

## 2025-03-11 PROCEDURE — 83690 ASSAY OF LIPASE: CPT

## 2025-03-11 PROCEDURE — 84484 ASSAY OF TROPONIN QUANT: CPT

## 2025-03-11 PROCEDURE — 71045 X-RAY EXAM CHEST 1 VIEW: CPT

## 2025-03-11 RX ORDER — SUCRALFATE 1 G
10 TABLET ORAL
Qty: 300 | Refills: 0
Start: 2025-03-11 | End: 2025-03-20

## 2025-03-11 RX ADMIN — Medication 1000 MILLILITER(S): at 02:50

## 2025-03-11 RX ADMIN — Medication 40 MILLIEQUIVALENT(S): at 02:22

## 2025-03-11 RX ADMIN — Medication 1000 MILLILITER(S): at 01:52

## 2025-03-11 RX ADMIN — Medication 20 MILLIGRAM(S): at 01:37

## 2025-03-11 NOTE — ED PROVIDER NOTE - PROVIDER TOKENS
PROVIDER:[TOKEN:[[id number]],FOLLOWUP:[1-3 Days]],FREE:[LAST:[your ob/gyn],PHONE:[(   )    -],FAX:[(   )    -],FOLLOWUP:[1-3 Days]]

## 2025-03-11 NOTE — ED PROVIDER NOTE - NSFOLLOWUPINSTRUCTIONS_ED_ALL_ED_FT
ESOPHAGITIS - AfterCare(R) Instructions(ER/ED)    Esophagitis    WHAT YOU NEED TO KNOW:    Esophagitis is inflammation or irritation of the lining of the esophagus.  Digestive Tract    DISCHARGE INSTRUCTIONS:    Call your local emergency number (911 in the [place]) for any of the following:    You have any of the following signs of a heart attack:  Squeezing, pressure, or pain in your chest    You may also have any of the following:  Discomfort or pain in your back, neck, jaw, stomach, or arm    Shortness of breath    Nausea or vomiting    Lightheadedness or a sudden cold sweat    Return to the emergency department if:    You feel like you have food stuck in your throat and you cannot cough it out.    Call your doctor if:    You have new or worsening symptoms, even after treatment.    You have questions or concerns about your condition or care.  Medicines:    Medicines may be given to fight an infection or to control stomach acid.    Take your medicine as directed. Contact your healthcare provider if you think your medicine is not helping or if you have side effects. Tell your provider if you are allergic to any medicine. Keep a list of the medicines, vitamins, and herbs you take. Include the amounts, and when and why you take them. Bring the list or the pill bottles to follow-up visits. Carry your medicine list with you in case of an emergency.  Manage or prevent esophagitis:    Do not smoke. Nicotine and other chemicals in cigarettes and cigars can irritate and damage your esophagus. Ask your healthcare provider for information if you currently smoke and need help to quit. E-cigarettes or smokeless tobacco still contain nicotine. Talk to your healthcare provider before you use these products.    Do not drink alcohol. Alcohol can irritate your esophagus. Talk to your healthcare provider if you need help to stop drinking.    Limit or do not eat foods that can lead to esophagitis. Foods such as oranges and salsa can irritate your esophagus. Caffeine and chocolate can cause acid reflux. High-fat and fried foods make your stomach digest food more slowly. This increases the amount of stomach acid your esophagus is exposed to. Eat small meals, and drink water with your meals. Soft foods such as yogurt and applesauce may help soothe your throat. Do not eat for at least 3 hours before you go to bed.    Drink more liquid when you take pills. Drink a full glass of water when you take your pills. Ask your healthcare provider if you can take your pills at least an hour before you go to bed.    Prevent acid reflux. Do not bend over unless it is necessary. Acid may back up into your esophagus when you bend over. If possible, keep the head of your bed elevated while you sleep. This will help keep acid from backing up. Manage stress. Stress can make your symptoms worse or cause stomach acid to back up.    Keep batteries and similar objects out of the reach of children. Babies often put items in their mouths to explore them. Button batteries are easy to swallow and can cause serious damage. Keep the battery covers of electronic devices such as remote controls taped closed. Store all batteries and toxic materials where children cannot get to them. Use childproof locks to keep children away from dangerous materials.  Follow up with your doctor as directed: Your doctor may refer you to a stomach specialist, allergist, or dietitian. You may need ongoing tests or treatment. Write down your questions so you remember to ask them during your visits.

## 2025-03-11 NOTE — ED ADULT TRIAGE NOTE - CHIEF COMPLAINT QUOTE
epigastric sternal  pain for 2 days worse after eating epigastric sternal  pain for 2 days worse after eating, 18 weeks preg lmp 11/5

## 2025-03-11 NOTE — CONSULT NOTE ADULT - SUBJECTIVE AND OBJECTIVE BOX
[facility]    Surinder Tuyet Mccain NP    [address] [place] [address] [phone]      Chief Complaint:  Patient is a 34y old  Female who presents with a chief complaint of     HPI:  33 yo F  at 18 wks gestation presents c/o burning chest discomfort with some shortness of breath since yesterday, exacerbated with food/liquid intake. States the discomfort is constant since yesterday but she able to sleep. Denies N/V. Denies abd pain, urinary symptoms, URI symptoms, cough, leg pain or swelling, recent travel, prior hx of DVT/PE. Pt reports her pregnancy has been uncomplicated. Last prenatal visit was last week and everything is going well. Reports she has a hx of gallstones but that pain is very differen    Allergies:  penicillin (Unknown)      Medications:      PMHX/PSHX:  No pertinent past medical history    No pertinent past medical history    No significant past surgical history    No significant past surgical history        Family history:      Social History:     ROS:     General:  No wt loss, fevers, chills, night sweats, fatigue,   Eyes:  Good vision, no reported pain  ENT:  No sore throat, pain, runny nose, dysphagia  CV:  No pain, palpitations, hypo/hypertension  Resp:  No dyspnea, cough, tachypnea, wheezing  GI:  No pain, No nausea, No vomiting, No diarrhea, No constipation, No weight loss, No fever, No pruritis, No rectal bleeding, No tarry stools, No dysphagia,  :  No pain, bleeding, incontinence, nocturia  Muscle:  No pain, weakness  Neuro:  No weakness, tingling, memory problems  Psych:  No fatigue, insomnia, mood problems, depression  Endocrine:  No polyuria, polydipsia, cold/heat intolerance  Heme:  No petechiae, ecchymosis, easy bruisability  Skin:  No rash, tattoos, scars, edema      PHYSICAL EXAM:   Vital Signs:  Vital Signs Last 24 Hrs  T(C): 37 (11 Mar 2025 06:20), Max: 37 (11 Mar 2025 06:20)  T(F): 98.6 (11 Mar 2025 06:20), Max: 98.6 (11 Mar 2025 06:20)  HR: 77 (11 Mar 2025 06:20) (77 - 80)  BP: 96/59 (11 Mar 2025 06:20) (96/59 - 100/68)  BP(mean): --  RR: 17 (11 Mar 2025 06:20) (17 - 18)  SpO2: 96% (11 Mar 2025 06:20) (96% - 98%)    Parameters below as of 11 Mar 2025 06:20  Patient On (Oxygen Delivery Method): room air      Daily Height in cm: 162.56 (11 Mar 2025 00:56)    Daily     GENERAL:  Appears stated age, well-groomed, well-nourished, no distress  HEENT:  NC/AT,  conjunctivae clear and pink, no thyromegaly, nodules, adenopathy, no JVD, sclera -anicteric  CHEST:  Full & symmetric excursion, no increased effort, breath sounds clear  HEART:  Regular rhythm, S1, S2, no murmur/rub/S3/S4, no abdominal bruit, no edema  ABDOMEN:  Soft, non-tender, non-distended, normoactive bowel sounds,  no masses ,no hepato-splenomegaly, no signs of chronic liver disease  EXTEREMITIES:  no cyanosis,clubbing or edema  SKIN:  No rash/erythema/ecchymoses/petechiae/wounds/abscess/warm/dry  NEURO:  Alert, oriented, no asterixis, no tremor, no encephalopathy    LABS:                        10.6   6.92  )-----------( 220      ( 11 Mar 2025 01:25 )             30.6     03-11    138  |  104  |  2[L]  ----------------------------<  95  3.0[L]   |  29  |  0.49[L]    Ca    8.6      11 Mar 2025 01:25    TPro  5.9[L]  /  Alb  2.6[L]  /  TBili  0.2  /  DBili  x   /  AST  9[L]  /  ALT  16  /  AlkPhos  55  03-11    LIVER FUNCTIONS - ( 11 Mar 2025 01:25 )  Alb: 2.6 g/dL / Pro: 5.9 g/dL / ALK PHOS: 55 U/L / ALT: 16 U/L / AST: 9 U/L / GGT: x             Urinalysis Basic - ( 11 Mar 2025 01:25 )    Color: x / Appearance: x / SG: x / pH: x  Gluc: 95 mg/dL / Ketone: x  / Bili: x / Urobili: x   Blood: x / Protein: x / Nitrite: x   Leuk Esterase: x / RBC: x / WBC x   Sq Epi: x / Non Sq Epi: x / Bacteria: x      Amylase Serum--      Lipase serum12       Ammonia--      Imaging:    < from: CT Angio Chest PE Protocol w/ IV Cont (25 @ 09:12) >    IMPRESSION:    Negative for pulmonary embolism.  Esophageal wall thickening, question esophagitis.    --- End of Report ---        < end of copied text >            
Date/Time Patient Seen:  		  Referring MD:   Data Reviewed	       Patient is a 34y old  Female who presents with a chief complaint of     Subjective/HPI      Preferred Language to Address Healthcare:  · Preferred Language to Address Healthcare	English     Patient Identity:  · Birth Sex	Female     Child Abuse Assessment (patients less than 13 yrs):  Chief Complaint: chest pain.    · Chief Complaint: The patient is a 34y Female complaining of chest pain.  · HPI Objective Statement: 33 yo F  at 18 wks gestation presents c/o burning chest discomfort with some shortness of breath since yesterday, exacerbated with food/liquid intake. States the discomfort is constant since yesterday but she able to sleep. Denies N/V. Denies abd pain, urinary symptoms, URI symptoms, cough, leg pain or swelling, recent travel, prior hx of DVT/PE. Pt reports her pregnancy has been uncomplicated. Last prenatal visit was last week and everything is going well. Reports she has a hx of gallstones but that pain is very different.    HIV:    HIV Test Questions:  · In accordance with [facility] law, we offer every patient who comes to our ED an HIV test. Would you like to be tested today?	Opt out    HEPATITIS C TEST QUESTIONS:    Hepatitis C Test Questions:  · In accordance with [facility] Law, we offer every patient a Hepatitis C test. Would you like to be tested today?	Opt out    PAST MEDICAL/SURGICAL/FAMILY/SOCIAL HISTORY:    Past Medical, Past Surgical, and Family History:  PAST MEDICAL HISTORY:  No pertinent past medical history     No pertinent past medical history.     PAST SURGICAL HISTORY:  No significant past surgical history     No significant past surgical history.     Tobacco Usage:  · Tobacco Usage	Never smoker    ALLERGIES AND HOME MEDICATIONS:   Allergies:        Allergies:  	penicillin: Drug, Unknown    Home Medications:   * Patient Currently Takes Medications as of 25-Sep-2024 01:35 documented in Structured Notes  · 	ondansetron 4 mg oral tablet, disintegratin tab(s) orally 3 times a day as needed for  nausea  · 	cefpodoxime 200 mg oral tablet: 1 tab(s) orally 2 times a day MDD: 1 tab  · 	ondansetron 4 mg oral tablet, disintegratin tab(s) orally every 8 hours as needed for  nausea  · 	doxycycline hyclate 100 mg oral tablet: 1 tab(s) orally 2 times a day  · 	Macrobid 100 mg oral capsule: 1 cap(s) orally 2 times a day  · 	Clindamycin (Eqv-Cleocin T) 1% topical gel: Apply topically to affected area once a day    VITAL SIGNS ([place]):    ,,ED ADULT Flow Sheet:    11-Mar-2025 00:56  · Temp (F): 98  · Temp (C) Temp (C): 36.7  · Temp site Temp Site: oral  · Heart Rate Heart Rate (beats/min): 80  · BP Systolic Systolic: 100  · BP Diastolic Diastolic (mm Hg): 68  · Respiration Rate (breaths/min) Respiration Rate (breaths/min): 18  · SpO2 (%) SpO2 (%): 98  · O2 Delivery/Oxygen Delivery Method Patient On (Oxygen Delivery Method): room air  · How was the weight captured? Weight Type/Method: actual  · Dosing Weight (KILOGRAMS) Dosing Weight (KILOGRAMS): 77  · Dosing Weight  (POUNDS) Dosing Weight (POUNDS): 169.7  · Height type Height Type: stated  · Height (FEET) Height (FEET): 5  · Height (INCHES) Height (INCHES): 4  · Height (CENTIMETERS) Height (CENTIMETERS): 162.56  · BSA (m2): 1.82    PAST MEDICAL & SURGICAL HISTORY:  No pertinent past medical history    No pertinent past medical history    No significant past surgical history    No significant past surgical history          Medication list         MEDICATIONS  (STANDING):    MEDICATIONS  (PRN):         Vitals log        ICU Vital Signs Last 24 Hrs  T(C): 37 (11 Mar 2025 06:20), Max: 37 (11 Mar 2025 06:20)  T(F): 98.6 (11 Mar 2025 06:20), Max: 98.6 (11 Mar 2025 06:20)  HR: 77 (11 Mar 2025 06:20) (77 - 80)  BP: 96/59 (11 Mar 2025 06:20) (96/59 - 100/68)  BP(mean): --  ABP: --  ABP(mean): --  RR: 17 (11 Mar 2025 06:20) (17 - 18)  SpO2: 96% (11 Mar 2025 06:20) (96% - 98%)    O2 Parameters below as of 11 Mar 2025 06:20  Patient On (Oxygen Delivery Method): room air                 Input and Output:  I&O's Detail      Lab Data                        10.6   6.92  )-----------( 220      ( 11 Mar 2025 01:25 )             30.6     03-11    138  |  104  |  2[L]  ----------------------------<  95  3.0[L]   |  29  |  0.49[L]    Ca    8.6      11 Mar 2025 01:25    TPro  5.9[L]  /  Alb  2.6[L]  /  TBili  0.2  /  DBili  x   /  AST  9[L]  /  ALT  16  /  AlkPhos  55  03-11            Review of Systems	  chest tightness  alert  verbal  on RA      Objective     Physical Examination    heart s1s2  lung dc BS  head nc  head at  abd soft  cn grossly int      Pertinent Lab findings & Imaging      Coleman:  NO   Adequate UO     I&O's Detail           Discussed with:     Cultures:	        Radiology      ACC: [id number] EXAM:  US DPLX LWR EXT VEINS COMPL BI   ORDERED BY:  SHELLIE FELICIANO     PROCEDURE DATE:  2025          INTERPRETATION:  CLINICAL INFORMATION: Chest pain.    COMPARISON: None available.    TECHNIQUE: Duplex sonography of the BILATERAL LOWER extremity veins with   color and spectral Doppler, with and without compression.    FINDINGS:    RIGHT:  Normal compressibility of the RIGHT common femoral, femoral and popliteal   veins.  Doppler examination shows normal spontaneous and phasic flow.  No RIGHT calf vein thrombosis is detected.    LEFT:  Normal compressibility of the LEFT common femoral, femoral and popliteal   veins.  Doppler examination shows normal spontaneous and phasic flow.  No LEFT calf vein thrombosis is detected.    IMPRESSION:  No evidence of deep venous thrombosis in either lower extremity.            --- End of Report ---            WENCESLAO RECIO MD; Attending Radiologist  This document has been electronically signed. Mar 11 2025  3:33AM

## 2025-03-11 NOTE — CONSULT NOTE ADULT - ASSESSMENT
Esophagitis    Recommendations:  - Trial of Tums for now given pregnancy; if not alleviated can consider Carafate 1g QID (liquid)  - Ideally would avoid PPI. If Carafate not alleviating can trial Famotidine  - Dietary lifestyle modifications discussed (ate hot dogs prior to presentation)  - IVF  - f/u OBGYN  - Will follow with you    Russ Fountain M.D.  [facility]  [phone]  
33 yo F  at 18 wks gestation presents c/o burning chest discomfort with some shortness of breath since yesterday, exacerbated with food/liquid intake. States the discomfort is constant since yesterday but she able to sleep. Denies N/V. Denies abd pain, urinary symptoms, URI symptoms, cough, leg pain or swelling, recent travel, prior hx of DVT/PE.    chest tightness  pregnancy  dyspnea    pt is   on RA  no travel  no drug use  takes prenatal vits  I zaria  plan for VQ scan  LE doppler NEG  FLU swab planned  sw eval -   monitor VS and HD and Sat  work up in progress

## 2025-03-11 NOTE — ED PROVIDER NOTE - CARE PROVIDERS DIRECT ADDRESSES
,[email or web address],DirectAddress_Unknown

## 2025-03-11 NOTE — ED ADULT NURSE NOTE - NSFALLUNIVINTERV_ED_ALL_ED
Bed/Stretcher in lowest position, wheels locked, appropriate side rails in place/Call bell, personal items and telephone in reach/Instruct patient to call for assistance before getting out of bed/chair/stretcher/Non-slip footwear applied when patient is off stretcher/[place] to call system/Physically safe environment - no spills, clutter or unnecessary equipment/Purposeful proactive rounding/Room/bathroom lighting operational, light cord in reach

## 2025-03-11 NOTE — ED PROVIDER NOTE - PROGRESS NOTE DETAILS
Discussed with radiology attending Dr. Telles he relates recommendation from radiology service line is CTA rather than V/Q and pregnancy pregnant Discussed with radiology attending Dr. Telles he relates recommendation from radiology service line is CTA rather than V/Q and pregnancy pregnant. Patient consents to CTA Discussed with Dr. Fountain (attending GI) he saw evaluated patient recommends discharge with Tums and Carafate  Reevaluated patient at bedside.  Patient feeling improved.  Discussed the results of all diagnostic testing in ED and copies of all reports given.   An opportunity to ask questions was given.  Discussed the importance of prompt, close medical follow-up.  Patient will return with any changes, concerns or persistent / worsening symptoms.  Understanding of all instructions verbalized.

## 2025-03-11 NOTE — ED PROVIDER NOTE - OBJECTIVE STATEMENT
35 yo F  at 18 wks gestation presents c/o burning chest discomfort with some shortness of breath since yesterday, exacerbated with food/liquid intake. States the discomfort is constant since yesterday but she able to sleep. Denies N/V. Denies abd pain, urinary symptoms, URI symptoms, cough, leg pain or swelling, recent travel, prior hx of DVT/PE. Pt reports her pregnancy has been uncomplicated. Last prenatal visit was last week and everything is going well. Reports she has a hx of gallstones but that pain is very different.

## 2025-03-11 NOTE — ED PROVIDER NOTE - CARE PROVIDER_API CALL
Russ Fountain  Gastroenterology  [address] [place] [address]  Phone: [phone]  Fax: [phone]  Follow Up Time: 1-3 Days    your ob/gyn,   Phone: (   )    -  Fax: (   )    -  Follow Up Time: 1-3 Days

## 2025-03-11 NOTE — ED PROVIDER NOTE - PATIENT PORTAL LINK FT
You can access the FollowMyHealth Patient Portal offered by [facility] by registering at the following website: [email or web address]. By joining [facility] FollowMyHealth portal, you will also be able to view your health information using other applications (apps) compatible with our system.

## 2025-03-11 NOTE — ED PROVIDER NOTE - CLINICAL SUMMARY MEDICAL DECISION MAKING FREE TEXT BOX
33 yo F at 18 wks gestation c/o burning chest pain since yesterday. Likely GERD, however will r/o ACS, PE cardiomyopathy, PTX. Will get labs, EKG, CXR. Pt agreeable with CXR. Will give pepcid. Will reassess.

## 2025-03-17 ENCOUNTER — APPOINTMENT (OUTPATIENT)
Dept: OBGYN | Facility: CLINIC | Age: 35
End: 2025-03-17
Payer: MEDICAID

## 2025-03-17 DIAGNOSIS — O99.810 ABNORMAL GLUCOSE COMPLICATING PREGNANCY: ICD-10-CM

## 2025-03-17 DIAGNOSIS — O09.90 SUPERVISION OF HIGH RISK PREGNANCY, UNSPECIFIED, UNSPECIFIED TRIMESTER: ICD-10-CM

## 2025-03-17 PROCEDURE — 36415 COLL VENOUS BLD VENIPUNCTURE: CPT

## 2025-03-19 ENCOUNTER — NON-APPOINTMENT (OUTPATIENT)
Age: 35
End: 2025-03-19

## 2025-03-25 DIAGNOSIS — Z3A.20 20 WEEKS GESTATION OF PREGNANCY: ICD-10-CM

## 2025-03-26 ENCOUNTER — NON-APPOINTMENT (OUTPATIENT)
Age: 35
End: 2025-03-26

## 2025-03-26 RX ORDER — PRENATAL VIT NO.126/IRON/FOLIC 28MG-0.8MG
28-0.8 TABLET ORAL DAILY
Refills: 0 | Status: ACTIVE | COMMUNITY
Start: 2025-03-26

## 2025-03-27 ENCOUNTER — NON-APPOINTMENT (OUTPATIENT)
Age: 35
End: 2025-03-27

## 2025-03-31 ENCOUNTER — NON-APPOINTMENT (OUTPATIENT)
Age: 35
End: 2025-03-31

## 2025-04-02 ENCOUNTER — NON-APPOINTMENT (OUTPATIENT)
Age: 35
End: 2025-04-02

## 2025-04-04 ENCOUNTER — APPOINTMENT (OUTPATIENT)
Dept: OBGYN | Facility: CLINIC | Age: 35
End: 2025-04-04
Payer: MEDICAID

## 2025-04-04 VITALS
HEIGHT: 64 IN | OXYGEN SATURATION: 99 % | BODY MASS INDEX: 29.53 KG/M2 | WEIGHT: 173 LBS | HEART RATE: 86 BPM | DIASTOLIC BLOOD PRESSURE: 62 MMHG | RESPIRATION RATE: 16 BRPM | SYSTOLIC BLOOD PRESSURE: 90 MMHG

## 2025-04-04 DIAGNOSIS — F11.10 OPIOID ABUSE, UNCOMPLICATED: ICD-10-CM

## 2025-04-04 DIAGNOSIS — O09.90 SUPERVISION OF HIGH RISK PREGNANCY, UNSPECIFIED, UNSPECIFIED TRIMESTER: ICD-10-CM

## 2025-04-04 DIAGNOSIS — Z3A.21 21 WEEKS GESTATION OF PREGNANCY: ICD-10-CM

## 2025-04-04 DIAGNOSIS — F19.11 OTHER PSYCHOACTIVE SUBSTANCE ABUSE, IN REMISSION: ICD-10-CM

## 2025-04-04 PROCEDURE — 99213 OFFICE O/P EST LOW 20 MIN: CPT | Mod: TH

## 2025-04-08 ENCOUNTER — APPOINTMENT (OUTPATIENT)
Dept: ANTEPARTUM | Facility: CLINIC | Age: 35
End: 2025-04-08

## 2025-04-21 ENCOUNTER — ASOB RESULT (OUTPATIENT)
Age: 35
End: 2025-04-21

## 2025-04-21 ENCOUNTER — APPOINTMENT (OUTPATIENT)
Dept: ANTEPARTUM | Facility: CLINIC | Age: 35
End: 2025-04-21
Payer: MEDICAID

## 2025-04-21 PROCEDURE — 76817 TRANSVAGINAL US OBSTETRIC: CPT

## 2025-04-21 PROCEDURE — 76811 OB US DETAILED SNGL FETUS: CPT

## 2025-05-01 ENCOUNTER — NON-APPOINTMENT (OUTPATIENT)
Age: 35
End: 2025-05-01

## 2025-05-07 ENCOUNTER — NON-APPOINTMENT (OUTPATIENT)
Age: 35
End: 2025-05-07

## 2025-05-09 ENCOUNTER — NON-APPOINTMENT (OUTPATIENT)
Age: 35
End: 2025-05-09

## 2025-05-09 ENCOUNTER — APPOINTMENT (OUTPATIENT)
Dept: OBGYN | Facility: CLINIC | Age: 35
End: 2025-05-09
Payer: MEDICAID

## 2025-05-09 VITALS
HEIGHT: 64 IN | SYSTOLIC BLOOD PRESSURE: 100 MMHG | HEART RATE: 95 BPM | WEIGHT: 179.13 LBS | DIASTOLIC BLOOD PRESSURE: 56 MMHG | OXYGEN SATURATION: 97 % | BODY MASS INDEX: 30.58 KG/M2 | RESPIRATION RATE: 14 BRPM

## 2025-05-09 DIAGNOSIS — F43.10 POST-TRAUMATIC STRESS DISORDER, UNSPECIFIED: ICD-10-CM

## 2025-05-09 DIAGNOSIS — F19.11 OTHER PSYCHOACTIVE SUBSTANCE ABUSE, IN REMISSION: ICD-10-CM

## 2025-05-09 DIAGNOSIS — D58.2 OTHER HEMOGLOBINOPATHIES: ICD-10-CM

## 2025-05-09 DIAGNOSIS — Z3A.26 26 WEEKS GESTATION OF PREGNANCY: ICD-10-CM

## 2025-05-09 DIAGNOSIS — Z3A.17 17 WEEKS GESTATION OF PREGNANCY: ICD-10-CM

## 2025-05-09 DIAGNOSIS — O09.529 SUPERVISION OF ELDERLY MULTIGRAVIDA, UNSPECIFIED TRIMESTER: ICD-10-CM

## 2025-05-09 DIAGNOSIS — B95.5 UNSPECIFIED INFECTION OF URINARY TRACT IN PREGNANCY, UNSPECIFIED TRIMESTER: ICD-10-CM

## 2025-05-09 DIAGNOSIS — O23.40 UNSPECIFIED INFECTION OF URINARY TRACT IN PREGNANCY, UNSPECIFIED TRIMESTER: ICD-10-CM

## 2025-05-09 PROCEDURE — 99213 OFFICE O/P EST LOW 20 MIN: CPT | Mod: TH

## 2025-05-16 ENCOUNTER — NON-APPOINTMENT (OUTPATIENT)
Age: 35
End: 2025-05-16

## 2025-05-16 ENCOUNTER — APPOINTMENT (OUTPATIENT)
Dept: OBGYN | Facility: CLINIC | Age: 35
End: 2025-05-16
Payer: MEDICAID

## 2025-05-16 DIAGNOSIS — Z34.92 ENCOUNTER FOR SUPERVISION OF NORMAL PREGNANCY, UNSPECIFIED, SECOND TRIMESTER: ICD-10-CM

## 2025-05-16 PROCEDURE — 36415 COLL VENOUS BLD VENIPUNCTURE: CPT

## 2025-05-19 LAB
ABORH: NORMAL
ANTIBODY SCREEN: NORMAL
BASOPHILS # BLD AUTO: 0.02 K/UL
BASOPHILS NFR BLD AUTO: 0.2 %
EOSINOPHIL # BLD AUTO: 0.13 K/UL
EOSINOPHIL NFR BLD AUTO: 1.4 %
GLUCOSE 1H P 50 G GLC PO SERPL-MCNC: 99 MG/DL
HCT VFR BLD CALC: 33 %
HGB BLD-MCNC: 10.3 G/DL
IMM GRANULOCYTES NFR BLD AUTO: 0.2 %
LYMPHOCYTES # BLD AUTO: 1.48 K/UL
LYMPHOCYTES NFR BLD AUTO: 16.2 %
MAN DIFF?: NORMAL
MCHC RBC-ENTMCNC: 30.6 PG
MCHC RBC-ENTMCNC: 31.2 G/DL
MCV RBC AUTO: 97.9 FL
MONOCYTES # BLD AUTO: 0.57 K/UL
MONOCYTES NFR BLD AUTO: 6.2 %
NEUTROPHILS # BLD AUTO: 6.94 K/UL
NEUTROPHILS NFR BLD AUTO: 75.8 %
PLATELET # BLD AUTO: 256 K/UL
RBC # BLD: 3.37 M/UL
RBC # FLD: 12.4 %
T PALLIDUM AB SER QL IA: NEGATIVE
WBC # FLD AUTO: 9.16 K/UL

## 2025-05-22 ENCOUNTER — NON-APPOINTMENT (OUTPATIENT)
Age: 35
End: 2025-05-22

## 2025-05-25 ENCOUNTER — INPATIENT (INPATIENT)
Facility: HOSPITAL | Age: 35
LOS: 3 days | Discharge: HOME | DRG: 690 | End: 2025-05-29
Attending: INTERNAL MEDICINE | Admitting: OBSTETRICS & GYNECOLOGY
Payer: MEDICAID

## 2025-05-25 VITALS
WEIGHT: 179.9 LBS | HEART RATE: 113 BPM | RESPIRATION RATE: 18 BRPM | TEMPERATURE: 101 F | DIASTOLIC BLOOD PRESSURE: 63 MMHG | SYSTOLIC BLOOD PRESSURE: 102 MMHG | HEIGHT: 64 IN | OXYGEN SATURATION: 98 %

## 2025-05-25 DIAGNOSIS — N12 TUBULO-INTERSTITIAL NEPHRITIS, NOT SPECIFIED AS ACUTE OR CHRONIC: ICD-10-CM

## 2025-05-25 LAB
ADD ON TEST-SPECIMEN IN LAB: SIGNIFICANT CHANGE UP
ALBUMIN SERPL ELPH-MCNC: 2.7 G/DL — LOW (ref 3.3–5)
ALBUMIN SERPL ELPH-MCNC: 3.2 G/DL — LOW (ref 3.3–5)
ALP SERPL-CCNC: 90 U/L — SIGNIFICANT CHANGE UP (ref 40–120)
ALP SERPL-CCNC: 98 U/L — SIGNIFICANT CHANGE UP (ref 40–120)
ALT FLD-CCNC: 11 U/L — SIGNIFICANT CHANGE UP (ref 10–45)
ALT FLD-CCNC: 9 U/L — LOW (ref 10–45)
ANION GAP SERPL CALC-SCNC: 12 MMOL/L — SIGNIFICANT CHANGE UP (ref 5–17)
ANION GAP SERPL CALC-SCNC: 14 MMOL/L — SIGNIFICANT CHANGE UP (ref 5–17)
APPEARANCE UR: ABNORMAL
APTT BLD: 28.6 SEC — SIGNIFICANT CHANGE UP (ref 26.1–36.8)
APTT BLD: 29.7 SEC — SIGNIFICANT CHANGE UP (ref 26.1–36.8)
AST SERPL-CCNC: 11 U/L — SIGNIFICANT CHANGE UP (ref 10–40)
AST SERPL-CCNC: 14 U/L — SIGNIFICANT CHANGE UP (ref 10–40)
BACTERIA # UR AUTO: ABNORMAL /HPF
BASOPHILS # BLD AUTO: 0.01 K/UL — SIGNIFICANT CHANGE UP (ref 0–0.2)
BASOPHILS # BLD AUTO: 0.01 K/UL — SIGNIFICANT CHANGE UP (ref 0–0.2)
BASOPHILS NFR BLD AUTO: 0.1 % — SIGNIFICANT CHANGE UP (ref 0–2)
BASOPHILS NFR BLD AUTO: 0.1 % — SIGNIFICANT CHANGE UP (ref 0–2)
BILIRUB SERPL-MCNC: 0.4 MG/DL — SIGNIFICANT CHANGE UP (ref 0.2–1.2)
BILIRUB SERPL-MCNC: 0.5 MG/DL — SIGNIFICANT CHANGE UP (ref 0.2–1.2)
BILIRUB UR-MCNC: ABNORMAL
BLD GP AB SCN SERPL QL: NEGATIVE — SIGNIFICANT CHANGE UP
BUN SERPL-MCNC: 4 MG/DL — LOW (ref 7–23)
BUN SERPL-MCNC: <4 MG/DL — LOW (ref 7–23)
CALCIUM SERPL-MCNC: 8.7 MG/DL — SIGNIFICANT CHANGE UP (ref 8.4–10.5)
CALCIUM SERPL-MCNC: 8.8 MG/DL — SIGNIFICANT CHANGE UP (ref 8.4–10.5)
CAST: 1 /LPF — SIGNIFICANT CHANGE UP (ref 0–4)
CHLORIDE SERPL-SCNC: 103 MMOL/L — SIGNIFICANT CHANGE UP (ref 96–108)
CHLORIDE SERPL-SCNC: 98 MMOL/L — SIGNIFICANT CHANGE UP (ref 96–108)
CO2 SERPL-SCNC: 21 MMOL/L — LOW (ref 22–31)
CO2 SERPL-SCNC: 23 MMOL/L — SIGNIFICANT CHANGE UP (ref 22–31)
COLOR SPEC: SIGNIFICANT CHANGE UP
CREAT SERPL-MCNC: 0.53 MG/DL — SIGNIFICANT CHANGE UP (ref 0.5–1.3)
CREAT SERPL-MCNC: 0.69 MG/DL — SIGNIFICANT CHANGE UP (ref 0.5–1.3)
DIFF PNL FLD: ABNORMAL
EGFR: 115 ML/MIN/1.73M2 — SIGNIFICANT CHANGE UP
EGFR: 115 ML/MIN/1.73M2 — SIGNIFICANT CHANGE UP
EGFR: 123 ML/MIN/1.73M2 — SIGNIFICANT CHANGE UP
EGFR: 123 ML/MIN/1.73M2 — SIGNIFICANT CHANGE UP
EOSINOPHIL # BLD AUTO: 0.01 K/UL — SIGNIFICANT CHANGE UP (ref 0–0.5)
EOSINOPHIL # BLD AUTO: 0.05 K/UL — SIGNIFICANT CHANGE UP (ref 0–0.5)
EOSINOPHIL NFR BLD AUTO: 0.1 % — SIGNIFICANT CHANGE UP (ref 0–6)
EOSINOPHIL NFR BLD AUTO: 0.5 % — SIGNIFICANT CHANGE UP (ref 0–6)
FLUAV AG NPH QL: SIGNIFICANT CHANGE UP
FLUBV AG NPH QL: SIGNIFICANT CHANGE UP
GAS PNL BLDV: SIGNIFICANT CHANGE UP
GLUCOSE SERPL-MCNC: 111 MG/DL — HIGH (ref 70–99)
GLUCOSE SERPL-MCNC: 134 MG/DL — HIGH (ref 70–99)
GLUCOSE UR QL: NEGATIVE MG/DL — SIGNIFICANT CHANGE UP
HCT VFR BLD CALC: 27.6 % — LOW (ref 34.5–45)
HCT VFR BLD CALC: 29.9 % — LOW (ref 34.5–45)
HGB BLD-MCNC: 10.2 G/DL — LOW (ref 11.5–15.5)
HGB BLD-MCNC: 9.3 G/DL — LOW (ref 11.5–15.5)
IMM GRANULOCYTES NFR BLD AUTO: 0.5 % — SIGNIFICANT CHANGE UP (ref 0–0.9)
IMM GRANULOCYTES NFR BLD AUTO: 0.6 % — SIGNIFICANT CHANGE UP (ref 0–0.9)
INR BLD: 1.2 RATIO — HIGH (ref 0.85–1.16)
INR BLD: 1.25 RATIO — HIGH (ref 0.85–1.16)
KETONES UR QL: NEGATIVE MG/DL — SIGNIFICANT CHANGE UP
LACTATE SERPL-SCNC: 1.3 MMOL/L — SIGNIFICANT CHANGE UP (ref 0.5–2)
LEUKOCYTE ESTERASE UR-ACNC: ABNORMAL
LYMPHOCYTES # BLD AUTO: 1.13 K/UL — SIGNIFICANT CHANGE UP (ref 1–3.3)
LYMPHOCYTES # BLD AUTO: 1.21 K/UL — SIGNIFICANT CHANGE UP (ref 1–3.3)
LYMPHOCYTES # BLD AUTO: 12 % — LOW (ref 13–44)
LYMPHOCYTES # BLD AUTO: 9.1 % — LOW (ref 13–44)
MAGNESIUM SERPL-MCNC: 1.7 MG/DL — SIGNIFICANT CHANGE UP (ref 1.6–2.6)
MCHC RBC-ENTMCNC: 31.2 PG — SIGNIFICANT CHANGE UP (ref 27–34)
MCHC RBC-ENTMCNC: 31.7 PG — SIGNIFICANT CHANGE UP (ref 27–34)
MCHC RBC-ENTMCNC: 33.7 G/DL — SIGNIFICANT CHANGE UP (ref 32–36)
MCHC RBC-ENTMCNC: 34.1 G/DL — SIGNIFICANT CHANGE UP (ref 32–36)
MCV RBC AUTO: 92.6 FL — SIGNIFICANT CHANGE UP (ref 80–100)
MCV RBC AUTO: 92.9 FL — SIGNIFICANT CHANGE UP (ref 80–100)
MONOCYTES # BLD AUTO: 0.94 K/UL — HIGH (ref 0–0.9)
MONOCYTES # BLD AUTO: 1.09 K/UL — HIGH (ref 0–0.9)
MONOCYTES NFR BLD AUTO: 10.8 % — SIGNIFICANT CHANGE UP (ref 2–14)
MONOCYTES NFR BLD AUTO: 7.6 % — SIGNIFICANT CHANGE UP (ref 2–14)
NEUTROPHILS # BLD AUTO: 10.2 K/UL — HIGH (ref 1.8–7.4)
NEUTROPHILS # BLD AUTO: 7.71 K/UL — HIGH (ref 1.8–7.4)
NEUTROPHILS NFR BLD AUTO: 76.1 % — SIGNIFICANT CHANGE UP (ref 43–77)
NEUTROPHILS NFR BLD AUTO: 82.5 % — HIGH (ref 43–77)
NITRITE UR-MCNC: POSITIVE
NRBC BLD AUTO-RTO: 0 /100 WBCS — SIGNIFICANT CHANGE UP (ref 0–0)
NRBC BLD AUTO-RTO: 0 /100 WBCS — SIGNIFICANT CHANGE UP (ref 0–0)
PH UR: 6 — SIGNIFICANT CHANGE UP (ref 5–8)
PHOSPHATE SERPL-MCNC: 3 MG/DL — SIGNIFICANT CHANGE UP (ref 2.5–4.5)
PLATELET # BLD AUTO: 193 K/UL — SIGNIFICANT CHANGE UP (ref 150–400)
PLATELET # BLD AUTO: 195 K/UL — SIGNIFICANT CHANGE UP (ref 150–400)
POTASSIUM SERPL-MCNC: 2.6 MMOL/L — CRITICAL LOW (ref 3.5–5.3)
POTASSIUM SERPL-MCNC: 3.1 MMOL/L — LOW (ref 3.5–5.3)
POTASSIUM SERPL-SCNC: 2.6 MMOL/L — CRITICAL LOW (ref 3.5–5.3)
POTASSIUM SERPL-SCNC: 3.1 MMOL/L — LOW (ref 3.5–5.3)
PROT SERPL-MCNC: 5.7 G/DL — LOW (ref 6–8.3)
PROT SERPL-MCNC: 6.3 G/DL — SIGNIFICANT CHANGE UP (ref 6–8.3)
PROT UR-MCNC: 100 MG/DL
PROTHROM AB SERPL-ACNC: 13.8 SEC — HIGH (ref 9.9–13.4)
PROTHROM AB SERPL-ACNC: 14.2 SEC — HIGH (ref 9.9–13.4)
RBC # BLD: 2.98 M/UL — LOW (ref 3.8–5.2)
RBC # BLD: 3.22 M/UL — LOW (ref 3.8–5.2)
RBC # FLD: 12.6 % — SIGNIFICANT CHANGE UP (ref 10.3–14.5)
RBC # FLD: 12.7 % — SIGNIFICANT CHANGE UP (ref 10.3–14.5)
RBC CASTS # UR COMP ASSIST: 3 /HPF — SIGNIFICANT CHANGE UP (ref 0–4)
REVIEW: SIGNIFICANT CHANGE UP
RH IG SCN BLD-IMP: POSITIVE — SIGNIFICANT CHANGE UP
RSV RNA NPH QL NAA+NON-PROBE: SIGNIFICANT CHANGE UP
SARS-COV-2 RNA SPEC QL NAA+PROBE: SIGNIFICANT CHANGE UP
SODIUM SERPL-SCNC: 133 MMOL/L — LOW (ref 135–145)
SODIUM SERPL-SCNC: 138 MMOL/L — SIGNIFICANT CHANGE UP (ref 135–145)
SOURCE RESPIRATORY: SIGNIFICANT CHANGE UP
SP GR SPEC: 1.01 — SIGNIFICANT CHANGE UP (ref 1–1.03)
SQUAMOUS # UR AUTO: 24 /HPF — HIGH (ref 0–5)
UROBILINOGEN FLD QL: 4 MG/DL (ref 0.2–1)
WBC # BLD: 10.12 K/UL — SIGNIFICANT CHANGE UP (ref 3.8–10.5)
WBC # BLD: 12.36 K/UL — HIGH (ref 3.8–10.5)
WBC # FLD AUTO: 10.12 K/UL — SIGNIFICANT CHANGE UP (ref 3.8–10.5)
WBC # FLD AUTO: 12.36 K/UL — HIGH (ref 3.8–10.5)
WBC UR QL: >998 /HPF — HIGH (ref 0–5)

## 2025-05-25 PROCEDURE — 71045 X-RAY EXAM CHEST 1 VIEW: CPT | Mod: 26

## 2025-05-25 PROCEDURE — 93010 ELECTROCARDIOGRAM REPORT: CPT

## 2025-05-25 PROCEDURE — 76815 OB US LIMITED FETUS(S): CPT | Mod: 26

## 2025-05-25 PROCEDURE — 99285 EMERGENCY DEPT VISIT HI MDM: CPT | Mod: 25

## 2025-05-25 PROCEDURE — 99221 1ST HOSP IP/OBS SF/LOW 40: CPT

## 2025-05-25 PROCEDURE — 71045 X-RAY EXAM CHEST 1 VIEW: CPT | Mod: 26,77

## 2025-05-25 PROCEDURE — 76770 US EXAM ABDO BACK WALL COMP: CPT | Mod: 26

## 2025-05-25 RX ORDER — ACETAMINOPHEN 500 MG/5ML
1000 LIQUID (ML) ORAL ONCE
Refills: 0 | Status: COMPLETED | OUTPATIENT
Start: 2025-05-25 | End: 2025-05-25

## 2025-05-25 RX ORDER — SODIUM CHLORIDE 9 G/1000ML
500 INJECTION, SOLUTION INTRAVENOUS ONCE
Refills: 0 | Status: COMPLETED | OUTPATIENT
Start: 2025-05-25 | End: 2025-05-25

## 2025-05-25 RX ORDER — BUPRENORPHINE HYDROCHLORIDE, NALOXONE HYDROCHLORIDE 4; 1 MG/1; MG/1
1 FILM, SOLUBLE BUCCAL; SUBLINGUAL THREE TIMES A DAY
Refills: 0 | Status: DISCONTINUED | OUTPATIENT
Start: 2025-05-25 | End: 2025-05-29

## 2025-05-25 RX ORDER — ACETAMINOPHEN 500 MG/5ML
975 LIQUID (ML) ORAL EVERY 6 HOURS
Refills: 0 | Status: DISCONTINUED | OUTPATIENT
Start: 2025-05-25 | End: 2025-05-29

## 2025-05-25 RX ORDER — ONDANSETRON HCL/PF 4 MG/2 ML
4 VIAL (ML) INJECTION EVERY 8 HOURS
Refills: 0 | Status: DISCONTINUED | OUTPATIENT
Start: 2025-05-25 | End: 2025-05-29

## 2025-05-25 RX ORDER — HEPARIN SODIUM 1000 [USP'U]/ML
5000 INJECTION INTRAVENOUS; SUBCUTANEOUS EVERY 12 HOURS
Refills: 0 | Status: DISCONTINUED | OUTPATIENT
Start: 2025-05-25 | End: 2025-05-29

## 2025-05-25 RX ORDER — ACETAMINOPHEN 500 MG/5ML
975 LIQUID (ML) ORAL EVERY 6 HOURS
Refills: 0 | Status: DISCONTINUED | OUTPATIENT
Start: 2025-05-25 | End: 2025-05-25

## 2025-05-25 RX ORDER — POLYETHYLENE GLYCOL 3350 17 G/17G
17 POWDER, FOR SOLUTION ORAL ONCE
Refills: 0 | Status: COMPLETED | OUTPATIENT
Start: 2025-05-25 | End: 2025-05-26

## 2025-05-25 RX ORDER — CEFTRIAXONE 500 MG/1
1000 INJECTION, POWDER, FOR SOLUTION INTRAMUSCULAR; INTRAVENOUS ONCE
Refills: 0 | Status: COMPLETED | OUTPATIENT
Start: 2025-05-25 | End: 2025-05-25

## 2025-05-25 RX ORDER — CEFTRIAXONE 500 MG/1
1000 INJECTION, POWDER, FOR SOLUTION INTRAMUSCULAR; INTRAVENOUS EVERY 24 HOURS
Refills: 0 | Status: DISCONTINUED | OUTPATIENT
Start: 2025-05-25 | End: 2025-05-28

## 2025-05-25 RX ORDER — PRENATAL 136/IRON/FOLIC ACID 27 MG-1 MG
1 TABLET ORAL DAILY
Refills: 0 | Status: DISCONTINUED | OUTPATIENT
Start: 2025-05-25 | End: 2025-05-29

## 2025-05-25 RX ORDER — SODIUM CHLORIDE 9 G/1000ML
1000 INJECTION, SOLUTION INTRAVENOUS
Refills: 0 | Status: DISCONTINUED | OUTPATIENT
Start: 2025-05-25 | End: 2025-05-26

## 2025-05-25 RX ORDER — SENNA 187 MG
2 TABLET ORAL ONCE
Refills: 0 | Status: COMPLETED | OUTPATIENT
Start: 2025-05-25 | End: 2025-05-25

## 2025-05-25 RX ADMIN — Medication 975 MILLIGRAM(S): at 15:45

## 2025-05-25 RX ADMIN — BUPRENORPHINE HYDROCHLORIDE, NALOXONE HYDROCHLORIDE 1 TABLET(S): 4; 1 FILM, SOLUBLE BUCCAL; SUBLINGUAL at 09:11

## 2025-05-25 RX ADMIN — HEPARIN SODIUM 5000 UNIT(S): 1000 INJECTION INTRAVENOUS; SUBCUTANEOUS at 18:04

## 2025-05-25 RX ADMIN — SODIUM CHLORIDE 125 MILLILITER(S): 9 INJECTION, SOLUTION INTRAVENOUS at 22:19

## 2025-05-25 RX ADMIN — Medication 975 MILLIGRAM(S): at 09:50

## 2025-05-25 RX ADMIN — Medication 1 APPLICATION(S): at 13:51

## 2025-05-25 RX ADMIN — BUPRENORPHINE HYDROCHLORIDE, NALOXONE HYDROCHLORIDE 1 TABLET(S): 4; 1 FILM, SOLUBLE BUCCAL; SUBLINGUAL at 16:36

## 2025-05-25 RX ADMIN — Medication 100 MILLIEQUIVALENT(S): at 02:35

## 2025-05-25 RX ADMIN — Medication 40 MILLIEQUIVALENT(S): at 22:19

## 2025-05-25 RX ADMIN — Medication 40 MILLIEQUIVALENT(S): at 02:35

## 2025-05-25 RX ADMIN — SODIUM CHLORIDE 1000 MILLILITER(S): 9 INJECTION, SOLUTION INTRAVENOUS at 10:45

## 2025-05-25 RX ADMIN — SODIUM CHLORIDE 1000 MILLILITER(S): 9 INJECTION, SOLUTION INTRAVENOUS at 09:00

## 2025-05-25 RX ADMIN — SODIUM CHLORIDE 500 MILLILITER(S): 9 INJECTION, SOLUTION INTRAVENOUS at 18:04

## 2025-05-25 RX ADMIN — Medication 975 MILLIGRAM(S): at 08:51

## 2025-05-25 RX ADMIN — Medication 1000 MILLILITER(S): at 01:36

## 2025-05-25 RX ADMIN — SODIUM CHLORIDE 500 MILLILITER(S): 9 INJECTION, SOLUTION INTRAVENOUS at 15:17

## 2025-05-25 RX ADMIN — Medication 40 MILLIEQUIVALENT(S): at 15:24

## 2025-05-25 RX ADMIN — Medication 2 TABLET(S): at 13:51

## 2025-05-25 RX ADMIN — Medication 4 MILLIGRAM(S): at 09:26

## 2025-05-25 RX ADMIN — Medication 400 MILLIGRAM(S): at 01:36

## 2025-05-25 RX ADMIN — Medication 1 TABLET(S): at 11:34

## 2025-05-25 RX ADMIN — CEFTRIAXONE 100 MILLIGRAM(S): 500 INJECTION, POWDER, FOR SOLUTION INTRAMUSCULAR; INTRAVENOUS at 03:13

## 2025-05-25 RX ADMIN — HEPARIN SODIUM 5000 UNIT(S): 1000 INJECTION INTRAVENOUS; SUBCUTANEOUS at 05:34

## 2025-05-25 RX ADMIN — Medication 100 MILLIEQUIVALENT(S): at 05:34

## 2025-05-25 RX ADMIN — Medication 975 MILLIGRAM(S): at 23:43

## 2025-05-25 RX ADMIN — Medication 975 MILLIGRAM(S): at 15:29

## 2025-05-25 RX ADMIN — SODIUM CHLORIDE 125 MILLILITER(S): 9 INJECTION, SOLUTION INTRAVENOUS at 13:51

## 2025-05-25 NOTE — H&P ADULT - HISTORY OF PRESENT ILLNESS
FABIEN LOZA  34y  Female [id number]    HPI:  35yo  @ 28w5d (SONJA ) p/w right lower back pain and chills at home for 2 days. Pt reports symptoms started on Thursday and have persisted. She has also noticed that her urine appears more cloudy. Her pregnancy course has been uncomplicated thus far. She denies any contractions, LOF, VB and reports fetal movement. She denies SOB/CP, n/v, bowel or urinary changes.       Name of OB Physician: Dr. 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 Denies fibroids, cysts, endometriosis, STI's, Abnormal pap smears   PMHX: Opiod use disorder  PSHx: denies  Meds: Suboxone 0.5mg TID, PNV  All: PNV (hives)  Social History:  Denies smoking use, drug use, alcohol use.         Vital Signs Last 24 Hrs  T(C): 37.9 (25 May 2025 02:44), Max: 38.3 (25 May 2025 00:16)  T(F): 100.2 (25 May 2025 02:44), Max: 100.9 (25 May 2025 00:16)  HR: 106 (25 May 2025 02:44) (106 - 113)  BP: 111/67 (25 May 2025 02:44) (102/63 - 111/67)  BP(mean): --  RR: 18 (25 May 2025 02:44) (18 - 18)  SpO2: 98% (25 May 2025 02:44) (98% - 98%)    Parameters below as of 25 May 2025 02:44  Patient On (Oxygen Delivery Method): room air

## 2025-05-25 NOTE — H&P ADULT - NSHPPHYSICALEXAM_GEN_ALL_CORE
Physical Exam:   General: sitting comfortably in bed laying on left side, NAD   HEENT: neck supple, full ROM  CV: Appears well perfused  Lungs: saturating well on RA  Back: Right CVA tenderness  Abd: Soft, non-tender, non-distended.

## 2025-05-25 NOTE — ED PROVIDER NOTE - PATIENT RECEIVED FLUID AMOUNT
1000cc Principal Discharge DX:	Muscular pain  Secondary Diagnosis:	Acute left-sided low back pain without sciatica  Secondary Diagnosis:	Fall, initial encounter

## 2025-05-25 NOTE — PROVIDER CONTACT NOTE (OTHER) - ACTION/TREATMENT ORDERED:
patient transferred to [address] [place] [address]
LR 500ml bolus x 1  continuous pulse ox  2L nc when pulse ox <95% on room air  MICU evaluation, Dr. Hayes to see patient at bedside.
Tylenol 975mg PO  500cc LR bolus  Repeat blood pressure in one hour
NST reviewed, approved and signed by Dr. Stephenson.

## 2025-05-25 NOTE — CONSULT NOTE ADULT - NS ATTEND AMEND GEN_ALL_CORE FT
R pyelo/UTI, mild R hydro per ER POC sono in setting of pregnancy    Pt clinically stable, Afeb here  Labs improving/wnl  Awaiting final RBUS read  Cont abx, f/u cxs  Suspect R hydro related to infection and physiologic R Hydro of pregnancy as pt has no known h/o stones.    If pt clinically worsens/not improving, would consider MRU vs CT to further image for stone

## 2025-05-25 NOTE — H&P ADULT - ATTENDING COMMENTS
35yo  @ 28w5d (SONJA ) -Dx with pyelonephritis by exam findings of right low back pain, fever to 100.9F on presentation to ED and tachycardia to 113.  UA with +Nitrites, many bacteria, large leuks.     Pregnancy complicated by opioid substance use disorder.     Tmax 100.9, pulse 113    R flank tenderness    NST reactive, moderate variability  toco: no ctx    A/P:28 5/7 weeks -Pyelonephritis  Pyelonephritis in pregnancy  - c/w IV ceftriaxone (-)  - f/u UCx()  - F/u US renal, ordered in ED  -Tylenol PRN  - Monitor fever curve    Hypokalemia  K+ 2.9  On IV KCL x 3 doses  f/u AM BMP    Suboxone for opioid use disorder to be continued in hospital'    Hugo Gan MD

## 2025-05-25 NOTE — ED PROVIDER NOTE - OBJECTIVE STATEMENT
[Influenza] : Influenza [FreeTextEntry1] : PRESENTING FOR FLU VACCINE NO CONCERNS please refer to MDM and progress notes

## 2025-05-25 NOTE — PROVIDER CONTACT NOTE (OTHER) - BACKGROUND
SONJA 
 SONJA:  admitted to MICU for pyelonephritis.
28.5 weeks gestation, pyelonephritis  s/p Tylenol 975mg PO
Pyelonephritis, 28 weeks

## 2025-05-25 NOTE — CONSULT NOTE ADULT - ASSESSMENT
34 year old female with mild R hydro, pyelonephritis; 28 weeks pregnant    -f/u renal ultrasound  -f/u cultures, continue empiric abx  -analgesia prn  -  -supportive care per ICU and OB  -plan not finalized 34 year old female with mild R hydro, pyelonephritis; 28 weeks pregnant    Recommendations  -f/u renal ultrasound  -f/u cultures, continue empiric abx  -analgesia prn  -If patient fails to improve or clinically worsens, can obtain CT or MRU to eval for obstruction.  -supportive care per ICU and OB    Discussed with Dr. Guzman.

## 2025-05-25 NOTE — H&P ADULT - NSHPLABSRESULTS_GEN_ALL_CORE
LABS:                              10.2   12.36 )-----------( 195      ( 25 May 2025 01:22 )             29.9         133[L]  |  98  |  4[L]  ----------------------------<  134[H]  2.6[LL]   |  21[L]  |  0.69    Ca    8.7      25 May 2025 01:22  Mg     1.6         TPro  6.3  /  Alb  3.2[L]  /  TBili  0.5  /  DBili  x   /  AST  14  /  ALT  11  /  AlkPhos  98      I&O's Detail    PT/INR - ( 25 May 2025 01:22 )   PT: 14.2 sec;   INR: 1.25 ratio         PTT - ( 25 May 2025 01:22 )  PTT:29.7 sec  Urinalysis Basic - ( 25 May 2025 02:25 )    Color: Dark Yellow / Appearance: Turbid / S.013 / pH: x  Gluc: x / Ketone: x  / Bili: Small / Urobili: 4.0 mg/dL   Blood: x / Protein: 100 mg/dL / Nitrite: Positive   Leuk Esterase: Large / RBC: 3 /HPF / WBC >998 /HPF   Sq Epi: x / Non Sq Epi: 24 /HPF / Bacteria: Many /HPF        RADIOLOGY & ADDITIONAL STUDIES:

## 2025-05-25 NOTE — PROVIDER CONTACT NOTE (OTHER) - ASSESSMENT
EFM applied
Patient complaining of body aches
Patient feels "hot," is diaphoretic
Reactive NST Signed by MD Gordon

## 2025-05-25 NOTE — ED PROVIDER NOTE - ATTENDING CONTRIBUTION TO CARE
I was the supervising attending. I have independently seen face-to-face and examined the patient with the resident. I have reviewed the history and physical and discussed the MDM with the resident. I agree with the assessment and plan as presented unless otherwise documented as follows:    34F  currently 28 weeks pregnant presenting w/ fever, cloudy urine over approximately 3d. Tmax 104F, has been taking Tylenol w/ intermittent relief. Additionally notes R flank pain, suprapubic pressure, nausea, bodyaches. Otherwise denies vomiting, cough, congestion, CP/SOB, dysuria, hematuria, diarrhea, vaginal bleeding, leakage of clear fluid. Still feels normal fetal movements. Appears fatigued, awake and alert, no acute distress. Febrile/tachycardic, normotensive. Lungs CTABL, normal heart sounds, abdomen soft/gravid, R CVAT and suprapubic TTP present, ND. DDx UTI/pyelonephritis vs. viral syndrome vs. PNA. Will obtain labs, UA, CXR, reassess after antipyretics/fluids. May require antibiotics pending identification of infectious source, non-toxic appearing at this time. -Aggie Jason MD (Attending)

## 2025-05-25 NOTE — CHART NOTE - NSCHARTNOTEFT_GEN_A_CORE
MICU ACCEPT NOTE    CHIEF COMPLAINT: Patient is a 34y old  Female who presents with a chief complaint of Pyelonephritis in pregnancy (25 May 2025 04:19)      HPI:  FABIEN LOZA  34y  Female [id number]    HPI:  33yo  @ 28w5d (SONJA ) p/w right lower back pain and chills at home for 2 days. Pt reports symptoms started on Thursday and have persisted. She has also noticed that her urine appears more cloudy. Her pregnancy course has been uncomplicated thus far. She denies any contractions, LOF, VB and reports fetal movement. She denies SOB/CP, n/v, bowel or urinary changes.       Name of OB Physician: Dr. 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 Denies fibroids, cysts, endometriosis, STI's, Abnormal pap smears   PMHX: Opiod use disorder  PSHx: denies  Meds: Suboxone 0.5mg TID, PNV  All: PNV (hives)  Social History:  Denies smoking use, drug use, alcohol use.         Vital Signs Last 24 Hrs  T(C): 37.9 (25 May 2025 02:44), Max: 38.3 (25 May 2025 00:16)  T(F): 100.2 (25 May 2025 02:44), Max: 100.9 (25 May 2025 00:16)  HR: 106 (25 May 2025 02:44) (106 - 113)  BP: 111/67 (25 May 2025 02:44) (102/63 - 111/67)  BP(mean): --  RR: 18 (25 May 2025 02:44) (18 - 18)  SpO2: 98% (25 May 2025 02:44) (98% - 98%)    Parameters below as of 25 May 2025 02:44  Patient On (Oxygen Delivery Method): room air         (25 May 2025 04:19)      PAST MEDICAL & SURGICAL HISTORY:  No pertinent past medical history  No significant past surgical history      MEDICATIONS  (STANDING):  buprenorphine 2 mG/naloxone 0.5 mG SL  Tablet 1 Tablet(s) SubLingual three times a day  cefTRIAXone   IVPB 1000 milliGRAM(s) IV Intermittent every 24 hours  heparin   Injectable 5000 Unit(s) SubCutaneous every 12 hours  lactated ringers. 1000 milliLiter(s) (125 mL/Hr) IV Continuous <Continuous>  prenatal multivitamin 1 Tablet(s) Oral daily    MEDICATIONS  (PRN):  acetaminophen     Tablet .. 975 milliGRAM(s) Oral every 6 hours PRN Temp greater or equal to 38.5C (101.3F), Mild Pain (1 - 3), Moderate Pain (4 - 6)  ondansetron Injectable 4 milliGRAM(s) IV Push every 8 hours PRN Nausea and/or Vomiting      Allergies: penicillin (Unknown)      OBJECTIVE:  ICU Vital Signs Last 24 Hrs  T(C): 38.3 (25 May 2025 10:01), Max: 39.3 (25 May 2025 08:45)  T(F): 100.9 (25 May 2025 10:01), Max: 102.7 (25 May 2025 08:45)  HR: 99 (25 May 2025 10:01) (90 - 113)  BP: 86/49 (25 May 2025 10:01) (82/47 - 131/65)  BP(mean): --  ABP: --  ABP(mean): --  RR: 18 (25 May 2025 10:01) (18 - 18)  SpO2: 95% (25 May 2025 10:01) (95% - 100%)    O2 Parameters below as of 25 May 2025 10:01  Patient On (Oxygen Delivery Method): room air              CAPILLARY BLOOD GLUCOSE          PHYSICAL EXAM:  General: sitting comfortably in bed laying on left side, NAD   HEENT: neck supple, full ROM  CV: Appears well perfused  Lungs: saturating well on RA  Back: Right CVA tenderness  Abd: Soft, non-tender, non-distended.      LABS:                        10.2   12.36 )-----------( 195      ( 25 May 2025 01:22 )             29.9     Hgb Trend: 10.2<--      133[L]  |  98  |  4[L]  ----------------------------<  134[H]  2.6[LL]   |  21[L]  |  0.69    Ca    8.7      25 May 2025 01:22  Mg     1.6         TPro  6.3  /  Alb  3.2[L]  /  TBili  0.5  /  DBili  x   /  AST  14  /  ALT  11  /  AlkPhos  98      Creatinine Trend: 0.69<--  PT/INR - ( 25 May 2025 01:22 )   PT: 14.2 sec;   INR: 1.25 ratio         PTT - ( 25 May 2025 01:22 )  PTT:29.7 sec  Urinalysis Basic - ( 25 May 2025 02:25 )    Color: Dark Yellow / Appearance: Turbid / S.013 / pH: x  Gluc: x / Ketone: x  / Bili: Small / Urobili: 4.0 mg/dL   Blood: x / Protein: 100 mg/dL / Nitrite: Positive   Leuk Esterase: Large / RBC: 3 /HPF / WBC >998 /HPF   Sq Epi: x / Non Sq Epi: 24 /HPF / Bacteria: Many /HPF        Venous Blood Gas:   @ 01:14  7.40/41/29/25/45.9  VBG Lactate: 1.2      MICROBIOLOGY:     RADIOLOGY & ADDITIONAL TESTS:    ASSESSMENT  33yo  @ 28w5d (SONJA ) p/w right lower back pain and chills at home for 2 days found to be febrile (100.9) and tachycardic (113) admitted to the MICU for septic shock attributed to pyelonephritis.     =====Neurologic=====  Patient is AOx3    =====Cardiovascular=====  #Septic Shock   Patient was admitted with a WBC ( ) and pressures of ( ) They recieved ( ) fluids and started on ( ).     Plan  - Titrate pressors for MAP > 65  - pressor:   - Cont. ab as below     =====Pulmonary=====  #AHRF  CXR with clear lungs. on 2L NC      =====GI=====  - no active issues    =====Renal/=====  #Electrolytes  - mosher:    =====Infectious Disease=====  #Sepsis  WBC was 12 on admission with a fever 100.4 tmax. All in the setting of fevers/chills/back pain.  POCUS ED kidney/bladder: Mild hydronephrosis of the right kidney  CXR with clear lungs, U/A positive  - Continue with ceftriaxone  - FU blood cultures   - Continue shock treatment as above     =====Endocrine=====  No concerns presently.     =====Heme/Onc=====  - DVT PPX: Heparin 5000 units subq    =====MICUGeneral=====  Full code MICU ACCEPT NOTE    CHIEF COMPLAINT: Patient is a 34y old  Female who presents with a chief complaint of Pyelonephritis in pregnancy (25 May 2025 04:19)      HPI:  FABIEN LOZA  34y  Female [id number]    HPI:  35yo  @ 28w5d (SONJA ) hx opioid use disorder  p/w right lower back pain and chills at home for 2 days. Pt reports symptoms started on Thursday and have persisted. She has also noticed that her urine appears more cloudy. Her pregnancy course has been uncomplicated thus far. She denies any contractions, LOF, VB and reports fetal movement. She denies SOB/CP, n/v, bowel or urinary changes.       Name of OB Physician: Dr. 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 Denies fibroids, cysts, endometriosis, STI's, Abnormal pap smears   PMHX: Opiod use disorder  PSHx: denies  Meds: Suboxone 0.5mg TID, PNV  All: PNV (hives)  Social History:  Denies smoking use, drug use, alcohol use.         Vital Signs Last 24 Hrs  T(C): 37.9 (25 May 2025 02:44), Max: 38.3 (25 May 2025 00:16)  T(F): 100.2 (25 May 2025 02:44), Max: 100.9 (25 May 2025 00:16)  HR: 106 (25 May 2025 02:44) (106 - 113)  BP: 111/67 (25 May 2025 02:44) (102/63 - 111/67)  BP(mean): --  RR: 18 (25 May 2025 02:44) (18 - 18)  SpO2: 98% (25 May 2025 02:44) (98% - 98%)    Parameters below as of 25 May 2025 02:44  Patient On (Oxygen Delivery Method): room air         (25 May 2025 04:19)      PAST MEDICAL & SURGICAL HISTORY:  No pertinent past medical history  No significant past surgical history      MEDICATIONS  (STANDING):  buprenorphine 2 mG/naloxone 0.5 mG SL  Tablet 1 Tablet(s) SubLingual three times a day  cefTRIAXone   IVPB 1000 milliGRAM(s) IV Intermittent every 24 hours  heparin   Injectable 5000 Unit(s) SubCutaneous every 12 hours  lactated ringers. 1000 milliLiter(s) (125 mL/Hr) IV Continuous <Continuous>  prenatal multivitamin 1 Tablet(s) Oral daily    MEDICATIONS  (PRN):  acetaminophen     Tablet .. 975 milliGRAM(s) Oral every 6 hours PRN Temp greater or equal to 38.5C (101.3F), Mild Pain (1 - 3), Moderate Pain (4 - 6)  ondansetron Injectable 4 milliGRAM(s) IV Push every 8 hours PRN Nausea and/or Vomiting      Allergies: penicillin (Unknown)      OBJECTIVE:  ICU Vital Signs Last 24 Hrs  T(C): 38.3 (25 May 2025 10:01), Max: 39.3 (25 May 2025 08:45)  T(F): 100.9 (25 May 2025 10:01), Max: 102.7 (25 May 2025 08:45)  HR: 99 (25 May 2025 10:01) (90 - 113)  BP: 86/49 (25 May 2025 10:01) (82/47 - 131/65)  BP(mean): --  ABP: --  ABP(mean): --  RR: 18 (25 May 2025 10:01) (18 - 18)  SpO2: 95% (25 May 2025 10:01) (95% - 100%)    O2 Parameters below as of 25 May 2025 10:01  Patient On (Oxygen Delivery Method): room air              CAPILLARY BLOOD GLUCOSE          PHYSICAL EXAM:  General: well appearing, interactive, well nourished, no apparent distress, ncat  HEENT: EOMI, PERRLA, normal mucosa, normal oropharynx, no lesions on the lips or on oral mucosa, normal external ear  Neck: supple, no lymphadenopathy, full range of motion, no nuchal rigidity  CV: RRR, normal S1 and S2 with no murmur, capillary refill less than two seconds  Resp: lungs CTA b/l, good aeration bilaterally, symmetric chest wall   Abd: +Gravid uterus, non-tender  : +R CVA tenderness  MSK: full range of motion, no cyanosis, no edema, no clubbing, no immobility  Neuro: CN II-XII grossly intact, muscle strength 5/5 in all extremities  Skin: no rashes, skin intact      LABS:                        10.2   12.36 )-----------( 195      ( 25 May 2025 01:22 )             29.9     Hgb Trend: 10.2<--      133[L]  |  98  |  4[L]  ----------------------------<  134[H]  2.6[LL]   |  21[L]  |  0.69    Ca    8.7      25 May 2025 01:22  Mg     1.6         TPro  6.3  /  Alb  3.2[L]  /  TBili  0.5  /  DBili  x   /  AST  14  /  ALT  11  /  AlkPhos  98      Creatinine Trend: 0.69<--  PT/INR - ( 25 May 2025 01:22 )   PT: 14.2 sec;   INR: 1.25 ratio         PTT - ( 25 May 2025 01:22 )  PTT:29.7 sec  Urinalysis Basic - ( 25 May 2025 02:25 )    Color: Dark Yellow / Appearance: Turbid / S.013 / pH: x  Gluc: x / Ketone: x  / Bili: Small / Urobili: 4.0 mg/dL   Blood: x / Protein: 100 mg/dL / Nitrite: Positive   Leuk Esterase: Large / RBC: 3 /HPF / WBC >998 /HPF   Sq Epi: x / Non Sq Epi: 24 /HPF / Bacteria: Many /HPF        Venous Blood Gas:   @ 01:14  7.40/41/29/25/45.9  VBG Lactate: 1.2      MICROBIOLOGY:     RADIOLOGY & ADDITIONAL TESTS:    ASSESSMENT  35yo  @ 28w5d (SONJA ) with hx opioid use disorder p/w right lower back pain and chills at home for 2 days found to be febrile (100.9) and tachycardic (113) admitted to the MICU for sepsis attributed to pyelonephritis.     =====Neurologic=====  Patient is AOx3    =====Cardiovascular=====  #Septic Shock   Patient was admitted with a WBC ( ) and pressures of ( ) They recieved ( ) fluids and started on ( ).     Plan  - Titrate pressors for MAP > 65  - pressor:   - Cont. ab as below     =====Pulmonary=====  #AHRF  CXR with clear lungs. on 2L NC    =====GI=====  - no active issues, regular diet    =====Renal/=====  - no active issues    =====Infectious Disease=====  #Sepsis  WBC was 12 on admission with a fever 100.4 tmax. All in the setting of fevers/chills/back pain.  POCUS ED kidney/bladder: Mild hydronephrosis of the right kidney  CXR with clear lungs, U/A positive  - Continue with ceftriaxone (- )  - FU blood cultures   - Continue shock treatment as above     =====Endocrine=====  No concerns presently.     =====Heme/Onc=====  - DVT PPX: Heparin 5000 units subq    =====MICUGeneral=====  Full code    #Opoid use disorder  - pt on suboxone, continue MICU ACCEPT NOTE    CHIEF COMPLAINT: Patient is a 34y old  Female who presents with a chief complaint of Pyelonephritis in pregnancy (25 May 2025 04:19)      HPI:  FABIEN LOZA  34y  Female [id number]    HPI:  33yo  @ 28w5d (SONJA ) hx opioid use disorder  p/w right lower back pain and chills at home for 2 days. Pt reports symptoms started on Thursday and have persisted. She has also noticed that her urine appears more cloudy. Her pregnancy course has been uncomplicated thus far. She denies any contractions, LOF, VB and reports fetal movement. She denies SOB/CP, n/v, bowel or urinary changes.       Name of OB Physician: Dr. 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 Denies fibroids, cysts, endometriosis, STI's, Abnormal pap smears   PMHX: Opiod use disorder  PSHx: denies  Meds: Suboxone 0.5mg TID, PNV  All: PNV (hives)  Social History:  Denies smoking use, drug use, alcohol use.         Vital Signs Last 24 Hrs  T(C): 37.9 (25 May 2025 02:44), Max: 38.3 (25 May 2025 00:16)  T(F): 100.2 (25 May 2025 02:44), Max: 100.9 (25 May 2025 00:16)  HR: 106 (25 May 2025 02:44) (106 - 113)  BP: 111/67 (25 May 2025 02:44) (102/63 - 111/67)  BP(mean): --  RR: 18 (25 May 2025 02:44) (18 - 18)  SpO2: 98% (25 May 2025 02:44) (98% - 98%)    Parameters below as of 25 May 2025 02:44  Patient On (Oxygen Delivery Method): room air         (25 May 2025 04:19)      PAST MEDICAL & SURGICAL HISTORY:  No pertinent past medical history  No significant past surgical history      MEDICATIONS  (STANDING):  buprenorphine 2 mG/naloxone 0.5 mG SL  Tablet 1 Tablet(s) SubLingual three times a day  cefTRIAXone   IVPB 1000 milliGRAM(s) IV Intermittent every 24 hours  heparin   Injectable 5000 Unit(s) SubCutaneous every 12 hours  lactated ringers. 1000 milliLiter(s) (125 mL/Hr) IV Continuous <Continuous>  prenatal multivitamin 1 Tablet(s) Oral daily    MEDICATIONS  (PRN):  acetaminophen     Tablet .. 975 milliGRAM(s) Oral every 6 hours PRN Temp greater or equal to 38.5C (101.3F), Mild Pain (1 - 3), Moderate Pain (4 - 6)  ondansetron Injectable 4 milliGRAM(s) IV Push every 8 hours PRN Nausea and/or Vomiting      Allergies: penicillin (Unknown)      OBJECTIVE:  ICU Vital Signs Last 24 Hrs  T(C): 38.3 (25 May 2025 10:01), Max: 39.3 (25 May 2025 08:45)  T(F): 100.9 (25 May 2025 10:01), Max: 102.7 (25 May 2025 08:45)  HR: 99 (25 May 2025 10:01) (90 - 113)  BP: 86/49 (25 May 2025 10:01) (82/47 - 131/65)  BP(mean): --  ABP: --  ABP(mean): --  RR: 18 (25 May 2025 10:01) (18 - 18)  SpO2: 95% (25 May 2025 10:01) (95% - 100%)    O2 Parameters below as of 25 May 2025 10:01  Patient On (Oxygen Delivery Method): room air              CAPILLARY BLOOD GLUCOSE          PHYSICAL EXAM:  General: well appearing, interactive, well nourished, no apparent distress, ncat  HEENT: EOMI, PERRLA, normal mucosa, normal oropharynx, no lesions on the lips or on oral mucosa, normal external ear  Neck: supple, no lymphadenopathy, full range of motion, no nuchal rigidity  CV: RRR, normal S1 and S2 with no murmur, capillary refill less than two seconds  Resp: lungs CTA b/l, good aeration bilaterally, symmetric chest wall   Abd: +Gravid uterus, non-tender  : +R CVA tenderness  MSK: full range of motion, no cyanosis, no edema, no clubbing, no immobility  Neuro: CN II-XII grossly intact, muscle strength 5/5 in all extremities  Skin: no rashes, skin intact      LABS:                        10.2   12.36 )-----------( 195      ( 25 May 2025 01:22 )             29.9     Hgb Trend: 10.2<--      133[L]  |  98  |  4[L]  ----------------------------<  134[H]  2.6[LL]   |  21[L]  |  0.69    Ca    8.7      25 May 2025 01:22  Mg     1.6         TPro  6.3  /  Alb  3.2[L]  /  TBili  0.5  /  DBili  x   /  AST  14  /  ALT  11  /  AlkPhos  98      Creatinine Trend: 0.69<--  PT/INR - ( 25 May 2025 01:22 )   PT: 14.2 sec;   INR: 1.25 ratio         PTT - ( 25 May 2025 01:22 )  PTT:29.7 sec  Urinalysis Basic - ( 25 May 2025 02:25 )    Color: Dark Yellow / Appearance: Turbid / S.013 / pH: x  Gluc: x / Ketone: x  / Bili: Small / Urobili: 4.0 mg/dL   Blood: x / Protein: 100 mg/dL / Nitrite: Positive   Leuk Esterase: Large / RBC: 3 /HPF / WBC >998 /HPF   Sq Epi: x / Non Sq Epi: 24 /HPF / Bacteria: Many /HPF        Venous Blood Gas:   @ 01:14  7.40/41/29/25/45.9  VBG Lactate: 1.2      MICROBIOLOGY:     RADIOLOGY & ADDITIONAL TESTS:    ASSESSMENT  33yo  @ 28w5d (SONJA ) with hx opioid use disorder p/w right lower back pain and chills at home for 2 days found to be febrile (100.9) and tachycardic (113) admitted to the MICU for sepsis attributed to pyelonephritis.     =====Neurologic=====  Patient is AOx3    =====Cardiovascular=====  #Hypotension likely iso sepsis due to pyelonephritis  Currently not on pressors    Plan  - CTM and titrate pressors for MAP > 65  - Cont. ab as below     =====Pulmonary=====  #AHRF  CXR with clear lungs. on 2L NC  - CTM and wean oxygen as tolerated    =====GI=====  - no active issues, regular diet    =====Renal/=====  - no active issues    =====Infectious Disease=====  #Sepsis  WBC was 12 on admission with a fever 100.4 tmax. All in the setting of fevers/chills/back pain.  POCUS ED kidney/bladder: Mild hydronephrosis of the right kidney  CXR with clear lungs, U/A positive  - Continue with ceftriaxone (- )  - FU blood cultures   - Continue shock treatment as above     =====Endocrine=====  No concerns presently.     =====Heme/Onc=====  - DVT PPX: Heparin 5000 units subq    =====MICUGeneral=====  Full code    #Opoid use disorder  - pt on suboxone, continue

## 2025-05-25 NOTE — CONSULT NOTE ADULT - SUBJECTIVE AND OBJECTIVE BOX
UROLOGY CONSULT NOTE    HPI:  This is a 34y old Female with PMHx of prior opioid use on buprenorphine, who was admitted for pyelonephritis and was found on ER ultrasound to have mild right hydronephrosis.  The patient states that she has been having fevers, sweats, lethargy, and right flank pain for 3 days and came to the ER.  Denies  history, hematuria, dysuria and still feels tired but improved.   Of note, she is 28 weeks pregnant.  Currently in the MICU for monitoring.      PAST MEDICAL HISTORY    No pertinent past medical history      PAST SURGICAL HISTORY    No significant past surgical history      FAMILY HISTORY    noncontributory    SOCIAL HISTORY    noncontributory      HOME MEDICATIONS    buprenorphine      DRUG ALLERGIES    penicillin (Unknown)      REVIEW OF SYSTEMS: Pertinent positives and negatives as stated in HPI, otherwise negative      VITAL SIGNS    T(F): 97.5, Max: 102.7 (05-25-25 @ 08:45)  HR: 79  BP: 91/60  RR: 19  SpO2: 94%    I's & O's      25 May 2025 07:01  -  25 May 2025 17:58  --------------------------------------------------------  IN: 2025 mL / OUT: 150 mL / NET: 1875 mL        PHYSICAL EXAM    Gen: Well groomed, well dressed, well nourished  Abd: Soft, NT/ND  Back: +R CVAT  Ext: No edema present b/l      LABS:                        9.3    10.12 )-----------( 193               27.6     138  |  103  |  <4  ----------------------------<  111  3.1   |  23  |  0.53    Ca    8.8  Phos  3.0  Mg     1.7    TPro  5.7  /  Alb  2.7  /  TBili  0.4  /  DBili  x   /  AST  11  /  ALT  9   /  AlkPhos  90    PT: 13.8 sec;   INR: 1.20 ratio  PTT:28.6 sec      Urinalysis Basic:    Color: x / Appearance: x / SG: x / pH: x  Gluc: 111 mg/dL / Ketone: x  / Bili: x / Urobili: x   Blood: x / Protein: x / Nitrite: x   Leuk Esterase: x / RBC: x / WBC x   Sq Epi: x / Non Sq Epi: x / Bacteria: x      Urine culture: pending results    Blood culture: pending results      IMAGING:    ER ultrasound: Mild hydronephrosis of the right kidney.    Renal US: pending

## 2025-05-25 NOTE — H&P ADULT - ASSESSMENT
35yo  @ 28w5d (SONJA ) p/w right lower back pain and chills at home for 2 days. Pt febrile to 100.9F on presentation to ED and tachycardic to 113. +Right CVA tenderness. UA with +Nitrites, many bacteria, large leuks.   Findings c/f pyelonephritis    #Pyelonephritis in pregnancy  - c/w IV ceftriaxone (-)  - f/u UCx()  - F/u US renal, ordered in ED  -Tylenol PRN  - Monitor fever curve    #Hypokalemia  K+ 2.9  On IV KCL x 3 doses  f/u AM BMP    #Antepartum  - NST daily  - Regular diet      Karen Napier PGY2  D/w Dr. Gan

## 2025-05-25 NOTE — ED PROVIDER NOTE - PHYSICAL EXAMINATION
General: well appearing, interactive, well nourished, no apparent distress, ncat  HEENT: EOMI, PERRLA, normal mucosa, normal oropharynx, no lesions on the lips or on oral mucosa, normal external ear  Neck: supple, no lymphadenopathy, full range of motion, no nuchal rigidity  CV: RRR, normal S1 and S2 with no murmur, capillary refill less than two seconds  Resp: lungs CTA b/l, good aeration bilaterally, symmetric chest wall   Abd: +Gravid uterus, non-tender  : +R CVA tenderness  MSK: full range of motion, no cyanosis, no edema, no clubbing, no immobility  Neuro: CN II-XII grossly intact, muscle strength 5/5 in all extremities  Skin: no rashes, skin intact

## 2025-05-25 NOTE — PROVIDER CONTACT NOTE (OTHER) - SITUATION
SONJA:  admitted to MICU for pyelonephritis.
blood pressure 82/53- hr 103-temp 102.7
temp 100.9F, hr 99, blood pressure 86/49, RR 18, Pulse ox 95% on room air
Patient GA 28w5d presents to ED after feeling "sick with fever for a few days" at home

## 2025-05-25 NOTE — ED PROVIDER NOTE - PROGRESS NOTE DETAILS
Geovany Nugent,  (PGY-2) OB consulted iso sepsis in 3rd trimester. Pending US kidneys/FHR. Likely pyelo, abx ordered. Also hypokalemic ordered potassium repletion. Geovany Nugent DO (PGY-2) , Mild R hydroureteronephrosis. Ob stating ok to admit under Dr. haines.

## 2025-05-25 NOTE — CHART NOTE - NSCHARTNOTEFT_GEN_A_CORE
Patient seen at bedside with OB , Dr. Wade.   Patient states she feels sore all over and mild chills with sweats but denies any fevers. Denies any dysuria, hematuria, headaches, nausea, vomiting, and denies any contractions, leaking, or bleeding. Endorses good fetal movement.     Vital Signs Last 24 Hrs  T(C): 36.7 (25 May 2025 20:00), Max: 39.3 (25 May 2025 08:45)  T(F): 98 (25 May 2025 20:00), Max: 102.7 (25 May 2025 08:45)  HR: 74 (25 May 2025 21:00) (74 - 113)  BP: 89/55 (25 May 2025 21:00) (77/50 - 131/65)  BP(mean): 67 (25 May 2025 21:00) (58 - 83)  RR: 20 (25 May 2025 21:00) (17 - 22)  SpO2: 95% (25 May 2025 21:00) (94% - 100%)    Parameters below as of 25 May 2025 20:00  Patient On (Oxygen Delivery Method): room air      A/P: 33yo  @ 28w5d (SONJA ) admitted for treatment of pyelo. Currently in the MICU due to hypoxemia with complaints of SOB, as well as hypotension. Not currently on pressors or requiring supplemental O2. Afebrile, on ceftriaxone.     - appreciate excellent MICU care  - c/w empiric abx coverage until culture results back  - NST pending for PM      Sonia Stephenson, PGY3   [phone]

## 2025-05-25 NOTE — ED ADULT NURSE NOTE - BIRTH SEX
Female Rhofade Pregnancy And Lactation Text: This medication has not been assigned a Pregnancy Risk Category. It is unknown if the medication is excreted in breast milk.

## 2025-05-25 NOTE — ED ADULT NURSE NOTE - OBJECTIVE STATEMENT
The pt is a 34 Y F with a PMH of HTN, pregnant, 9 weeks. Pt is AOx4 breathing evenly on room air and able to speak in full sentences. pt came in today w co fevers today for 3 days. Pt denies head ache chest pain sob n/v/d/.c pt was placed in a gown in a stretcher with comfort and safety measures provided. Pt had an iv placed in the  L ac. pt is with  at bedside at this time. The pt is a 34 Y F with a PMH of HTN, pregnant, 9 weeks. Pt is AOx4 breathing evenly on room air and able to speak in full sentences. pt came in today w co fevers today for 3 days. Pt denies head ache chest pain sob n/v/d/.c pt was placed in a gown in a stretcher with comfort and safety measures provided. Pt had an iv placed in the  L ac. pt is with  at bedside at this time. pt had a fever at home today as high as 104. pt took tylenols at home today with no relief.

## 2025-05-25 NOTE — PATIENT PROFILE ADULT - FALL HARM RISK - RISK INTERVENTIONS
Bed in lowest position, wheels locked, appropriate side rails in place/Call bell, personal items and telephone in reach/Instruct patient to call for assistance before getting out of bed or chair/Non-slip footwear when patient is out of bed/[place] to call system/Physically safe environment - no spills, clutter or unnecessary equipment/Purposeful Proactive Rounding/Room/bathroom lighting operational, light cord in reach

## 2025-05-25 NOTE — PROVIDER CONTACT NOTE (OTHER) - RECOMMENDATIONS
Does patient need a higher level of care?
NST review
patient disconnected from [facility]co and [facility] after NST signed by MD Gordon

## 2025-05-26 LAB
ALBUMIN SERPL ELPH-MCNC: 2.9 G/DL — LOW (ref 3.3–5)
ALP SERPL-CCNC: 117 U/L — SIGNIFICANT CHANGE UP (ref 40–120)
ALT FLD-CCNC: 10 U/L — SIGNIFICANT CHANGE UP (ref 10–45)
ANION GAP SERPL CALC-SCNC: 17 MMOL/L — SIGNIFICANT CHANGE UP (ref 5–17)
APTT BLD: 28 SEC — SIGNIFICANT CHANGE UP (ref 26.1–36.8)
AST SERPL-CCNC: 23 U/L — SIGNIFICANT CHANGE UP (ref 10–40)
BILIRUB SERPL-MCNC: 0.4 MG/DL — SIGNIFICANT CHANGE UP (ref 0.2–1.2)
BUN SERPL-MCNC: 4 MG/DL — LOW (ref 7–23)
CALCIUM SERPL-MCNC: 9 MG/DL — SIGNIFICANT CHANGE UP (ref 8.4–10.5)
CHLORIDE SERPL-SCNC: 103 MMOL/L — SIGNIFICANT CHANGE UP (ref 96–108)
CO2 SERPL-SCNC: 18 MMOL/L — LOW (ref 22–31)
CREAT SERPL-MCNC: 0.47 MG/DL — LOW (ref 0.5–1.3)
EGFR: 126 ML/MIN/1.73M2 — SIGNIFICANT CHANGE UP
EGFR: 126 ML/MIN/1.73M2 — SIGNIFICANT CHANGE UP
GLUCOSE SERPL-MCNC: 91 MG/DL — SIGNIFICANT CHANGE UP (ref 70–99)
HCT VFR BLD CALC: 25.4 % — LOW (ref 34.5–45)
HCT VFR BLD CALC: 34.6 % — SIGNIFICANT CHANGE UP (ref 34.5–45)
HGB BLD-MCNC: 11 G/DL — LOW (ref 11.5–15.5)
HGB BLD-MCNC: 8.4 G/DL — LOW (ref 11.5–15.5)
INR BLD: 1.04 RATIO — SIGNIFICANT CHANGE UP (ref 0.85–1.16)
LACTATE BLDV-MCNC: 1.2 MMOL/L — SIGNIFICANT CHANGE UP (ref 0.5–2)
MAGNESIUM SERPL-MCNC: 1.7 MG/DL — SIGNIFICANT CHANGE UP (ref 1.6–2.6)
MCHC RBC-ENTMCNC: 30.6 PG — SIGNIFICANT CHANGE UP (ref 27–34)
MCHC RBC-ENTMCNC: 31.1 PG — SIGNIFICANT CHANGE UP (ref 27–34)
MCHC RBC-ENTMCNC: 31.8 G/DL — LOW (ref 32–36)
MCHC RBC-ENTMCNC: 33.1 G/DL — SIGNIFICANT CHANGE UP (ref 32–36)
MCV RBC AUTO: 94.1 FL — SIGNIFICANT CHANGE UP (ref 80–100)
MCV RBC AUTO: 96.4 FL — SIGNIFICANT CHANGE UP (ref 80–100)
NRBC BLD AUTO-RTO: 0 /100 WBCS — SIGNIFICANT CHANGE UP (ref 0–0)
NRBC BLD AUTO-RTO: 0 /100 WBCS — SIGNIFICANT CHANGE UP (ref 0–0)
PHOSPHATE SERPL-MCNC: 2.9 MG/DL — SIGNIFICANT CHANGE UP (ref 2.5–4.5)
PLATELET # BLD AUTO: 192 K/UL — SIGNIFICANT CHANGE UP (ref 150–400)
PLATELET # BLD AUTO: 207 K/UL — SIGNIFICANT CHANGE UP (ref 150–400)
POTASSIUM SERPL-MCNC: 3.4 MMOL/L — LOW (ref 3.5–5.3)
POTASSIUM SERPL-SCNC: 3.4 MMOL/L — LOW (ref 3.5–5.3)
PROT SERPL-MCNC: 6.3 G/DL — SIGNIFICANT CHANGE UP (ref 6–8.3)
PROTHROM AB SERPL-ACNC: 12 SEC — SIGNIFICANT CHANGE UP (ref 9.9–13.4)
RBC # BLD: 2.7 M/UL — LOW (ref 3.8–5.2)
RBC # BLD: 3.59 M/UL — LOW (ref 3.8–5.2)
RBC # FLD: 12.6 % — SIGNIFICANT CHANGE UP (ref 10.3–14.5)
RBC # FLD: 12.7 % — SIGNIFICANT CHANGE UP (ref 10.3–14.5)
SODIUM SERPL-SCNC: 138 MMOL/L — SIGNIFICANT CHANGE UP (ref 135–145)
WBC # BLD: 7.13 K/UL — SIGNIFICANT CHANGE UP (ref 3.8–10.5)
WBC # BLD: 7.48 K/UL — SIGNIFICANT CHANGE UP (ref 3.8–10.5)
WBC # FLD AUTO: 7.13 K/UL — SIGNIFICANT CHANGE UP (ref 3.8–10.5)
WBC # FLD AUTO: 7.48 K/UL — SIGNIFICANT CHANGE UP (ref 3.8–10.5)

## 2025-05-26 PROCEDURE — 76770 US EXAM ABDO BACK WALL COMP: CPT | Mod: 26

## 2025-05-26 PROCEDURE — 99232 SBSQ HOSP IP/OBS MODERATE 35: CPT

## 2025-05-26 PROCEDURE — 99232 SBSQ HOSP IP/OBS MODERATE 35: CPT | Mod: GC

## 2025-05-26 RX ADMIN — HEPARIN SODIUM 5000 UNIT(S): 1000 INJECTION INTRAVENOUS; SUBCUTANEOUS at 06:02

## 2025-05-26 RX ADMIN — BUPRENORPHINE HYDROCHLORIDE, NALOXONE HYDROCHLORIDE 1 TABLET(S): 4; 1 FILM, SOLUBLE BUCCAL; SUBLINGUAL at 01:05

## 2025-05-26 RX ADMIN — Medication 1 TABLET(S): at 12:39

## 2025-05-26 RX ADMIN — Medication 975 MILLIGRAM(S): at 21:02

## 2025-05-26 RX ADMIN — CEFTRIAXONE 100 MILLIGRAM(S): 500 INJECTION, POWDER, FOR SOLUTION INTRAMUSCULAR; INTRAVENOUS at 03:25

## 2025-05-26 RX ADMIN — POLYETHYLENE GLYCOL 3350 17 GRAM(S): 17 POWDER, FOR SOLUTION ORAL at 12:38

## 2025-05-26 RX ADMIN — HEPARIN SODIUM 5000 UNIT(S): 1000 INJECTION INTRAVENOUS; SUBCUTANEOUS at 18:33

## 2025-05-26 RX ADMIN — Medication 975 MILLIGRAM(S): at 20:32

## 2025-05-26 RX ADMIN — Medication 40 MILLIEQUIVALENT(S): at 06:02

## 2025-05-26 RX ADMIN — Medication 1 APPLICATION(S): at 06:02

## 2025-05-26 RX ADMIN — BUPRENORPHINE HYDROCHLORIDE, NALOXONE HYDROCHLORIDE 1 TABLET(S): 4; 1 FILM, SOLUBLE BUCCAL; SUBLINGUAL at 18:33

## 2025-05-26 RX ADMIN — Medication 975 MILLIGRAM(S): at 01:00

## 2025-05-26 RX ADMIN — BUPRENORPHINE HYDROCHLORIDE, NALOXONE HYDROCHLORIDE 1 TABLET(S): 4; 1 FILM, SOLUBLE BUCCAL; SUBLINGUAL at 10:52

## 2025-05-26 NOTE — PROGRESS NOTE ADULT - SUBJECTIVE AND OBJECTIVE BOX
Subjective  Afebrile overnight. Does feel like she has a temp this AM.     Objective    Vital signs  T(F): , Max: 100.9 (05-25-25 @ 10:01)  HR: 104 (05-26-25 @ 08:00)  BP: 120/64 (05-26-25 @ 08:00)  SpO2: 96% (05-26-25 @ 08:00)  Wt(kg): --    Output     OUT:    Voided (mL): 350 mL  Total OUT: 350 mL    Total NET: -350 mL          Gen: NAD    Labs      05-26 @ 00:18    WBC 7.48  / Hct 34.6  / SCr 0.47     05-25 @ 13:35    WBC 10.12 / Hct 27.6  / SCr 0.53         Culture - Urine (collected 05-25-25 @ 02:25)  Source: Clean Catch Clean Catch (Midstream)  Preliminary Report (05-25-25 @ 23:55):    >100,000 CFU/ml Escherichia coli    Culture - Blood (collected 05-25-25 @ 01:55)  Source: Blood Blood-Peripheral  Preliminary Report (05-26-25 @ 05:01):    No growth at 24 hours    Culture - Blood (collected 05-25-25 @ 00:45)  Source: Blood Blood-Peripheral  Preliminary Report (05-26-25 @ 05:01):    No growth at 24 hours            Imaging  < from: US Kidney and Bladder (05.26.25 @ 05:29) >    ACC: [id number] EXAM:  US KIDNEYS AND BLADDER   ORDERED BY:  BONG PARRISH     PROCEDURE DATE:  05/26/2025          INTERPRETATION:  CLINICAL INFORMATION: 28 weeks pregnant. Evaluate for   hydronephrosis    COMPARISON: None available.    TECHNIQUE: Sonography of the kidneys and bladder.    FINDINGS:  Right kidney: 12.8 cm. No renal mass or hydronephrosis. Millimeter-sized   upper pole bright reflector most compatible w/ nonobstructing calculus    Left kidney: 12.4 cm. No renal mass, hydronephrosis or calculi.    Urinary bladder: Within normal limits.    IMPRESSION:  No obstructive uropathy. Probable millimeter-sized RIGHT intrarenal   calculus        --- End of Report ---            JEMMA HERNANDEZ MD; Attending Radiologist  This document has been electronically signed. May 26 2025  8:57AM    < end of copied text >

## 2025-05-26 NOTE — PROGRESS NOTE ADULT - ASSESSMENT
33yo  @ 28w6d (SONJA ) admitted for pyelonephritis. MICU consulted due to hypotension and hypoxemia to 95%. Currently receiving IV abx and fluids, in the MICU. Fetal status overall reassuring.     #Pyelonephritis in pregnancy  - c/w IV ceftriaxone (-)  - f/u UCx()  - Renal US(): Mild hydronephrosis of the right kidney.  -Tylenol PRN  - Monitor fever curve    #Hypokalemia  - K:2.9 ->60mEq->3.1->74bEbm7-> 3.4  - labs per MICU    #fetal well being  - NST daily    #maternal well being  - Regular diet  - DONATOs      Sonia Stephenson, PGY3

## 2025-05-26 NOTE — PROGRESS NOTE ADULT - ASSESSMENT
ASSESSMENT  35yo  @ 28w5d (SONJA ) with hx opioid use disorder p/w right lower back pain and chills at home for 2 days found to be febrile (100.9) and tachycardic (113) admitted to the MICU for sepsis attributed to pyelonephritis.     =====Neurologic=====  Patient is AOx3    =====Cardiovascular=====  #Hypotension likely iso sepsis due to pyelonephritis  Currently not on pressors    Plan  > CTM and titrate pressors for MAP > 65  > Cont. ab as below     =====Pulmonary=====  #AHRF  CXR with clear lungs. on 2L NC    PLAN  > CTM and wean oxygen as tolerated    =====GI=====  - no active issues, regular diet    =====Renal/=====  - no active issues    =====Infectious Disease=====  #Sepsis  WBC was 12 on admission with a fever 100.4 tmax. All in the setting of fevers/chills/back pain.  POCUS ED kidney/bladder: Mild hydronephrosis of the right kidney  CXR with clear lungs, U/A positive    PLAN  > Continue with ceftriaxone (- )  > FU blood cultures   > Continue shock treatment as above     =====Endocrine=====  No concerns presently.     =====Heme/Onc=====  - DVT PPX: Heparin 5000 units subq    =====MICUGeneral=====  Full code    #Opoid use disorder  > pt on suboxone, continue.

## 2025-05-26 NOTE — PROGRESS NOTE ADULT - SUBJECTIVE AND OBJECTIVE BOX
PROGRESS NOTE  FABIEN LOZA (MRN: [id number]) 34y Female    INTERVAL HPI/OVERNIGHT EVENTS:    SUBJECTIVE: Patient seen and examined at bedside.     ----  VENT:   Drips:   Lines:     VITAL SIGNS:  ICU Vital Signs Last 24 Hrs  T(C): 37.1 (26 May 2025 04:00), Max: 39.3 (25 May 2025 08:45)  T(F): 98.7 (26 May 2025 04:00), Max: 102.7 (25 May 2025 08:45)  HR: 91 (26 May 2025 06:00) (74 - 111)  BP: 93/55 (26 May 2025 06:00) (77/50 - 112/57)  BP(mean): 67 (26 May 2025 06:00) (58 - 83)  ABP: --  ABP(mean): --  RR: 15 (26 May 2025 06:00) (15 - 22)  SpO2: 96% (26 May 2025 06:00) (93% - 100%)    O2 Parameters below as of 25 May 2025 20:00  Patient On (Oxygen Delivery Method): room air            I&Os:    05-25-25 @ 07:01  -  05-26-25 @ 07:00  --------------------------------------------------------  IN:    IV PiggyBack: 50 mL    Lactated Ringers: 2250 mL    Lactated Ringers Bolus: 1000 mL    Oral Fluid: 490 mL  Total IN: 3790 mL    OUT:    Voided (mL): 350 mL  Total OUT: 350 mL    Total NET: 3440 mL          PHYSICAL EXAM:  GENERAL: NAD, lying in bed comfortably  HEAD:  Atraumatic, Normocephalic  EYES: EOMI, PERRLA. No scleral icterus and no conjunctival injection. Tracks me in room  ENT: Moist mucous membranes  NECK: Supple, No JVD  CHEST/LUNG: Clear to auscultation bilaterally; No rales, rhonchi, wheezing, or rubs. Unlabored respirations  HEART: Regular rate and rhythm; No murmurs, rubs, or gallops  ABDOMEN: BS +; Soft, nontender, nondistended  EXTREMITIES:  2+ Peripheral Pulses, brisk capillary refill. No clubbing, cyanosis, or edema  NERVOUS SYSTEM:  A&Ox3, no focal neurological deficits. CN II-XII grossly intact, but not individually tested.  PSYCHIATRIC: Cooperative. Appropriate mood and affect.  SKIN: Dry, intact, No rashes or lesions    ECG/Telemetery: reviewed.    MEDICATIONS - STANDING:  buprenorphine 2 mG/naloxone 0.5 mG SL  Tablet 1 Tablet(s) SubLingual three times a day, 05-25-25 @ 08:19, Routine  cefTRIAXone   IVPB 1000 milliGRAM(s) IV Intermittent every 24 hours, 05-25-25 @ 04:37, Routine, Stop order after: 10 Days  chlorhexidine 2% Cloths 1 Application(s) Topical <User Schedule>, 05-25-25 @ 13:33, Routine  heparin   Injectable 5000 Unit(s) SubCutaneous every 12 hours, 05-25-25 @ 04:36, Routine  lactated ringers. 1000 milliLiter(s) (125 mL/Hr) IV Continuous <Continuous>, 05-25-25 @ 10:35, Routine  polyethylene glycol 3350 17 Gram(s) Oral once, 05-25-25 @ 13:40, Routine, Stop order after: 1 Doses  prenatal multivitamin 1 Tablet(s) Oral daily, 05-25-25 @ 04:35, Routine      MEDICATIONS - PRN:      INSULIN IF REQUIRED:        ALLERGIES:  Allergies    penicillin (Unknown)    Intolerances        LABS:                        11.0   7.48  )-----------( 207      ( 26 May 2025 00:18 )             34.6     05-26    138  |  103  |  4[L]  ----------------------------<  91  3.4[L]   |  18[L]  |  0.47[L]    Ca    9.0      26 May 2025 00:18  Phos  2.9     05-26  Mg     1.7     05-26    TPro  6.3  /  Alb  2.9[L]  /  TBili  0.4  /  DBili  x   /  AST  23  /  ALT  10  /  AlkPhos  117  05-26    PT/INR - ( 26 May 2025 00:18 )   PT: 12.0 sec;   INR: 1.04 ratio         PTT - ( 26 May 2025 00:18 )  PTT:28.0 sec  Urinalysis Basic - ( 26 May 2025 00:18 )    Color: x / Appearance: x / SG: x / pH: x  Gluc: 91 mg/dL / Ketone: x  / Bili: x / Urobili: x   Blood: x / Protein: x / Nitrite: x   Leuk Esterase: x / RBC: x / WBC x   Sq Epi: x / Non Sq Epi: x / Bacteria: x      ABG:   VBG: pH, Venous: 7.30 (05-25-25 @ 23:56)  pCO2, Venous: 52 mmHg (05-25-25 @ 23:56)  pO2, Venous: 21 mmHg (05-25-25 @ 23:56)  HCO3, Venous: 26 mmol/L (05-25-25 @ 23:56)  pH, Venous: 7.41 (05-25-25 @ 13:32)  pCO2, Venous: 39 mmHg (05-25-25 @ 13:32)  pO2, Venous: 55 mmHg (05-25-25 @ 13:32)  HCO3, Venous: 25 mmol/L (05-25-25 @ 13:32)    Micro:    Culture - Blood (collected 05-25-25 @ 01:55)  Source: Blood Blood-Peripheral  Preliminary Report (05-26-25 @ 05:01):    No growth at 24 hours    Culture - Blood (collected 05-25-25 @ 00:45)  Source: Blood Blood-Peripheral  Preliminary Report (05-26-25 @ 05:01):    No growth at 24 hours          RADIOLOGY & ADDITIONAL TESTS: Reviewed.

## 2025-05-26 NOTE — PROGRESS NOTE ADULT - ASSESSMENT
34 year old female with mild R hydro, pyelonephritis; 28 weeks pregnant    Recommendations  -RBUS with No obstructive uropathy. Probable millimeter-sized RIGHT intrarenal calculus.  -No acute urological intervention indicated  -urine culture with e coli, Blood cx NG24hr  -continue empiric abx  -analgesia prn  -supportive care per ICU and OB    Patient seen and examined with Dr. Guzman.

## 2025-05-26 NOTE — PROGRESS NOTE ADULT - NSPROGADDITIONALINFOA_GEN_ALL_CORE
MACARIOM Fellow Addendum:     33yo  at 28w6d with history of multiple UTIs in pregnancy, currently admitted for pyelonephritis and upgraded to MICU yesterday  due to hypotension and hypoxemia. She has been started on empiric IV ceftriaxone on . Ucx positive for >100k E.Coli (sensitivities pending). She continues to be febrile with most recent temp 100.4 at 8am today. Bcx with no growth to date. Lactate 2.3. BPs however have been improving and she has not required pressors during this admission. O2 sat has been >95% on room air. CXR negative from . Renal ultrasound with non-obstructing millimeter size right renal calculus. She continues to feel chills and back pain. Denies abdominal pain. Reports fetal movement. Additionally she has a history of substance use and is currently on suboxone TID, which is ordered.     Fetal status has been reassuring. Will discuss with MICU team to use remote monitor vs. paper NSTs to allow for review of tracings electronically. OB care has been with  at [place]. Prenatal records to be obtained by OB.     Recommendations:  - appreciate management of primary team in MICU   - continue with IV abx ceftriaxone and IV fluids  - monitor fever curve with tylenol prn   - NST BID   - f/u ucx sensitivities    Seen and discussed with HAMLET Henderson attending  HAMLET RiversF Fellow MACARIOM Fellow Addendum:     35yo  at 28w6d with history of multiple UTIs in pregnancy, currently admitted for pyelonephritis and upgraded to MICU yesterday  due to hypotension and hypoxemia. She has been started on empiric IV ceftriaxone on . Ucx positive for >100k E.Coli (sensitivities pending). She continues to be febrile with most recent temp 100.4 at 8am today. Bcx with no growth to date. Lactate 2.3. BPs however have been improving and she has not required pressors during this admission. O2 sat has been >95% on room air. CXR negative from . Renal ultrasound with non-obstructing millimeter size right renal calculus. She continues to feel chills and back pain. Denies abdominal pain. Reports fetal movement. Additionally she has a history of substance use and is currently on suboxone TID, which is ordered.     Fetal status has been reassuring. Will discuss with MICU team to use remote monitor vs. paper NSTs to allow for review of tracings electronically. OB care has been with  at [place]. Prenatal records to be obtained by OB.     Recommendations:  - appreciate management of primary team in MICU   - continue with IV abx ceftriaxone and IV fluids  - monitor fever curve with tylenol prn   - NST BID   - f/u ucx sensitivities    Seen and discussed with HAMLET Henderson attending  HAMLET Rivers Fellow

## 2025-05-26 NOTE — PROGRESS NOTE ADULT - SUBJECTIVE AND OBJECTIVE BOX
PROGRESS NOTE  FABIEN LOZA (MRN: [id number]) 34y Female    INTERVAL HPI/OVERNIGHT EVENTS:    SUBJECTIVE: Patient seen and examined at bedside.     ----  VENT:   Drips:   Lines:     VITAL SIGNS:  ICU Vital Signs Last 24 Hrs  T(C): 37.1 (26 May 2025 04:00), Max: 39.3 (25 May 2025 08:45)  T(F): 98.7 (26 May 2025 04:00), Max: 102.7 (25 May 2025 08:45)  HR: 91 (26 May 2025 06:00) (74 - 111)  BP: 93/55 (26 May 2025 06:00) (77/50 - 112/57)  BP(mean): 67 (26 May 2025 06:00) (58 - 83)  ABP: --  ABP(mean): --  RR: 15 (26 May 2025 06:00) (15 - 22)  SpO2: 96% (26 May 2025 06:00) (93% - 100%)    O2 Parameters below as of 25 May 2025 20:00  Patient On (Oxygen Delivery Method): room air            I&Os:    05-25-25 @ 07:01  -  05-26-25 @ 07:00  --------------------------------------------------------  IN:    IV PiggyBack: 50 mL    Lactated Ringers: 2250 mL    Lactated Ringers Bolus: 1000 mL    Oral Fluid: 490 mL  Total IN: 3790 mL    OUT:    Voided (mL): 350 mL  Total OUT: 350 mL    Total NET: 3440 mL          PHYSICAL EXAM:  GENERAL: NAD, lying in bed comfortably  HEAD:  Atraumatic, Normocephalic  EYES: EOMI, PERRLA. No scleral icterus and no conjunctival injection. Tracks me in room  ENT: Moist mucous membranes  NECK: Supple, No JVD  CHEST/LUNG: Clear to auscultation bilaterally; No rales, rhonchi, wheezing, or rubs. Unlabored respirations  HEART: Regular rate and rhythm; No murmurs, rubs, or gallops  ABDOMEN: BS +; Soft, nontender, nondistended  EXTREMITIES:  2+ Peripheral Pulses, brisk capillary refill. No clubbing, cyanosis, or edema  NERVOUS SYSTEM:  A&Ox3, no focal neurological deficits. CN II-XII grossly intact, but not individually tested.  PSYCHIATRIC: Cooperative. Appropriate mood and affect.  SKIN: Dry, intact, No rashes or lesions    ECG/Telemetery: reviewed.    MEDICATIONS - STANDING:  buprenorphine 2 mG/naloxone 0.5 mG SL  Tablet 1 Tablet(s) SubLingual three times a day, 05-25-25 @ 08:19, Routine  cefTRIAXone   IVPB 1000 milliGRAM(s) IV Intermittent every 24 hours, 05-25-25 @ 04:37, Routine, Stop order after: 10 Days  chlorhexidine 2% Cloths 1 Application(s) Topical <User Schedule>, 05-25-25 @ 13:33, Routine  heparin   Injectable 5000 Unit(s) SubCutaneous every 12 hours, 05-25-25 @ 04:36, Routine  lactated ringers. 1000 milliLiter(s) (125 mL/Hr) IV Continuous <Continuous>, 05-25-25 @ 10:35, Routine  polyethylene glycol 3350 17 Gram(s) Oral once, 05-25-25 @ 13:40, Routine, Stop order after: 1 Doses  prenatal multivitamin 1 Tablet(s) Oral daily, 05-25-25 @ 04:35, Routine      MEDICATIONS - PRN:      INSULIN IF REQUIRED:        ALLERGIES:  Allergies    penicillin (Unknown)    Intolerances        LABS:                        11.0   7.48  )-----------( 207      ( 26 May 2025 00:18 )             34.6     05-26    138  |  103  |  4[L]  ----------------------------<  91  3.4[L]   |  18[L]  |  0.47[L]    Ca    9.0      26 May 2025 00:18  Phos  2.9     05-26  Mg     1.7     05-26    TPro  6.3  /  Alb  2.9[L]  /  TBili  0.4  /  DBili  x   /  AST  23  /  ALT  10  /  AlkPhos  117  05-26    PT/INR - ( 26 May 2025 00:18 )   PT: 12.0 sec;   INR: 1.04 ratio         PTT - ( 26 May 2025 00:18 )  PTT:28.0 sec  Urinalysis Basic - ( 26 May 2025 00:18 )    Color: x / Appearance: x / SG: x / pH: x  Gluc: 91 mg/dL / Ketone: x  / Bili: x / Urobili: x   Blood: x / Protein: x / Nitrite: x   Leuk Esterase: x / RBC: x / WBC x   Sq Epi: x / Non Sq Epi: x / Bacteria: x      ABG:   VBG: pH, Venous: 7.30 (05-25-25 @ 23:56)  pCO2, Venous: 52 mmHg (05-25-25 @ 23:56)  pO2, Venous: 21 mmHg (05-25-25 @ 23:56)  HCO3, Venous: 26 mmol/L (05-25-25 @ 23:56)  pH, Venous: 7.41 (05-25-25 @ 13:32)  pCO2, Venous: 39 mmHg (05-25-25 @ 13:32)  pO2, Venous: 55 mmHg (05-25-25 @ 13:32)  HCO3, Venous: 25 mmol/L (05-25-25 @ 13:32)    Micro:    Culture - Blood (collected 05-25-25 @ 01:55)  Source: Blood Blood-Peripheral  Preliminary Report (05-26-25 @ 05:01):    No growth at 24 hours    Culture - Blood (collected 05-25-25 @ 00:45)  Source: Blood Blood-Peripheral  Preliminary Report (05-26-25 @ 05:01):    No growth at 24 hours          RADIOLOGY & ADDITIONAL TESTS: Reviewed. PROGRESS NOTE  FABIEN LOZA (MRN: [id number]) 34y Female    INTERVAL HPI/OVERNIGHT EVENTS: No acute events ON    SUBJECTIVE: Patient seen and examined at bedside. She reports feeling cold and rigorous     ----  VENT:   Drips:   Lines:     VITAL SIGNS:  ICU Vital Signs Last 24 Hrs  T(C): 37.1 (26 May 2025 04:00), Max: 39.3 (25 May 2025 08:45)  T(F): 98.7 (26 May 2025 04:00), Max: 102.7 (25 May 2025 08:45)  HR: 91 (26 May 2025 06:00) (74 - 111)  BP: 93/55 (26 May 2025 06:00) (77/50 - 112/57)  BP(mean): 67 (26 May 2025 06:00) (58 - 83)  ABP: --  ABP(mean): --  RR: 15 (26 May 2025 06:00) (15 - 22)  SpO2: 96% (26 May 2025 06:00) (93% - 100%)    O2 Parameters below as of 25 May 2025 20:00  Patient On (Oxygen Delivery Method): room air            I&Os:    05-25-25 @ 07:01  -  05-26-25 @ 07:00  --------------------------------------------------------  IN:    IV PiggyBack: 50 mL    Lactated Ringers: 2250 mL    Lactated Ringers Bolus: 1000 mL    Oral Fluid: 490 mL  Total IN: 3790 mL    OUT:    Voided (mL): 350 mL  Total OUT: 350 mL    Total NET: 3440 mL          PHYSICAL EXAM:  GENERAL: NAD, lying in bed comfortably  HEAD:  Atraumatic, Normocephalic  EYES: EOMI, PERRLA. No scleral icterus and no conjunctival injection. Tracks me in room  ENT: Moist mucous membranes  NECK: Supple, No JVD  CHEST/LUNG: Clear to auscultation bilaterally; No rales, rhonchi, wheezing, or rubs. Unlabored respirations  HEART: Regular rate and rhythm; No murmurs, rubs, or gallops  ABDOMEN: Gravid belly + CVA tenderness  EXTREMITIES:  2+ Peripheral Pulses, brisk capillary refill. No clubbing, cyanosis, or edema  NERVOUS SYSTEM:  A&Ox3, no focal neurological deficits. CN II-XII grossly intact, but not individually tested.  PSYCHIATRIC: Cooperative. Appropriate mood and affect.  SKIN: Dry, intact, No rashes or lesions    ECG/Telemetery: reviewed.    MEDICATIONS - STANDING:  buprenorphine 2 mG/naloxone 0.5 mG SL  Tablet 1 Tablet(s) SubLingual three times a day, 05-25-25 @ 08:19, Routine  cefTRIAXone   IVPB 1000 milliGRAM(s) IV Intermittent every 24 hours, 05-25-25 @ 04:37, Routine, Stop order after: 10 Days  chlorhexidine 2% Cloths 1 Application(s) Topical <User Schedule>, 05-25-25 @ 13:33, Routine  heparin   Injectable 5000 Unit(s) SubCutaneous every 12 hours, 05-25-25 @ 04:36, Routine  lactated ringers. 1000 milliLiter(s) (125 mL/Hr) IV Continuous <Continuous>, 05-25-25 @ 10:35, Routine  polyethylene glycol 3350 17 Gram(s) Oral once, 05-25-25 @ 13:40, Routine, Stop order after: 1 Doses  prenatal multivitamin 1 Tablet(s) Oral daily, 05-25-25 @ 04:35, Routine      MEDICATIONS - PRN:      INSULIN IF REQUIRED:        ALLERGIES:  Allergies    penicillin (Unknown)    Intolerances        LABS:                        11.0   7.48  )-----------( 207      ( 26 May 2025 00:18 )             34.6     05-26    138  |  103  |  4[L]  ----------------------------<  91  3.4[L]   |  18[L]  |  0.47[L]    Ca    9.0      26 May 2025 00:18  Phos  2.9     05-26  Mg     1.7     05-26    TPro  6.3  /  Alb  2.9[L]  /  TBili  0.4  /  DBili  x   /  AST  23  /  ALT  10  /  AlkPhos  117  05-26    PT/INR - ( 26 May 2025 00:18 )   PT: 12.0 sec;   INR: 1.04 ratio         PTT - ( 26 May 2025 00:18 )  PTT:28.0 sec  Urinalysis Basic - ( 26 May 2025 00:18 )    Color: x / Appearance: x / SG: x / pH: x  Gluc: 91 mg/dL / Ketone: x  / Bili: x / Urobili: x   Blood: x / Protein: x / Nitrite: x   Leuk Esterase: x / RBC: x / WBC x   Sq Epi: x / Non Sq Epi: x / Bacteria: x      ABG:   VBG: pH, Venous: 7.30 (05-25-25 @ 23:56)  pCO2, Venous: 52 mmHg (05-25-25 @ 23:56)  pO2, Venous: 21 mmHg (05-25-25 @ 23:56)  HCO3, Venous: 26 mmol/L (05-25-25 @ 23:56)  pH, Venous: 7.41 (05-25-25 @ 13:32)  pCO2, Venous: 39 mmHg (05-25-25 @ 13:32)  pO2, Venous: 55 mmHg (05-25-25 @ 13:32)  HCO3, Venous: 25 mmol/L (05-25-25 @ 13:32)    Micro:    Culture - Blood (collected 05-25-25 @ 01:55)  Source: Blood Blood-Peripheral  Preliminary Report (05-26-25 @ 05:01):    No growth at 24 hours    Culture - Blood (collected 05-25-25 @ 00:45)  Source: Blood Blood-Peripheral  Preliminary Report (05-26-25 @ 05:01):    No growth at 24 hours          RADIOLOGY & ADDITIONAL TESTS: Reviewed.

## 2025-05-26 NOTE — PROGRESS NOTE ADULT - SUBJECTIVE AND OBJECTIVE BOX
R3 Antepartum Note,       Patient seen and examined at bedside, no acute overnight events. No acute complaints. Pt reports +FM, denies LOF, VB, ctx, HA, epigastric pain, blurred vision, CP, SOB, N/V, fevers, and chills.    Vital Signs Last 24 Hours  T(C): 36.8 (05-26-25 @ 00:00), Max: 39.3 (05-25-25 @ 08:45)  HR: 90 (05-26-25 @ 01:00) (74 - 111)  BP: 92/53 (05-26-25 @ 01:00) (77/50 - 131/65)  RR: 20 (05-26-25 @ 01:00) (15 - 22)  SpO2: 93% (05-26-25 @ 01:00) (93% - 100%)    CAPILLARY BLOOD GLUCOSE          Physical Exam:  General: NAD  Abdomen: Soft, non-tender, gravid  Ext: No pain or swelling    Labs:             11.0   7.48  )-----------( 207      ( 05-26 @ 00:18 )             34.6     05-26 @ 00:18    138  |  103  |  4   ----------------------------<  91  3.4   |  18  |  0.47    Ca    9.0      05-26 @ 00:18  Phos  2.9     05-26 @ 00:18  Mg     1.7     05-26 @ 00:18    TPro  6.3  /  Alb  2.9  /  TBili  0.4  /  DBili  x   /  AST  23  /  ALT  10  /  AlkPhos  117  05-26 @ 00:18    PT/INR - ( 05-26 @ 00:18 )   PT: 12.0 sec;   INR: 1.04 ratio    PTT - ( 05-26 @ 00:18 )  PTT:28.0 sec        MEDICATIONS  (STANDING):  buprenorphine 2 mG/naloxone 0.5 mG SL  Tablet 1 Tablet(s) SubLingual three times a day  cefTRIAXone   IVPB 1000 milliGRAM(s) IV Intermittent every 24 hours  chlorhexidine 2% Cloths 1 Application(s) Topical <User Schedule>  heparin   Injectable 5000 Unit(s) SubCutaneous every 12 hours  lactated ringers. 1000 milliLiter(s) (125 mL/Hr) IV Continuous <Continuous>  polyethylene glycol 3350 17 Gram(s) Oral once  potassium chloride    Tablet ER 40 milliEquivalent(s) Oral once  prenatal multivitamin 1 Tablet(s) Oral daily    MEDICATIONS  (PRN):  acetaminophen     Tablet .. 975 milliGRAM(s) Oral every 6 hours PRN Temp greater or equal to 38.5C (101.3F), Mild Pain (1 - 3), Moderate Pain (4 - 6)  ondansetron Injectable 4 milliGRAM(s) IV Push every 8 hours PRN Nausea and/or Vomiting   R3 Antepartum Note,       Patient seen and examined at bedside. Reports feeling cold at this time, denies fevers. States her body feels sore because she was shivering all day. Denies any abdominal pain.    Pt reports +FM, denies LOF, VB, ctx, HA, epigastric pain, blurred vision, CP, SOB, N/V.    Vital Signs Last 24 Hours  T(C): 36.8 (05-26-25 @ 00:00), Max: 39.3 (05-25-25 @ 08:45)  HR: 90 (05-26-25 @ 01:00) (74 - 111)  BP: 92/53 (05-26-25 @ 01:00) (77/50 - 131/65)  RR: 20 (05-26-25 @ 01:00) (15 - 22)  SpO2: 93% (05-26-25 @ 01:00) (93% - 100%)    CAPILLARY BLOOD GLUCOSE          Physical Exam:  General: NAD  Abdomen: Soft, non-tender, gravid  Ext: No pain or swelling    Labs:             11.0   7.48  )-----------( 207      ( 05-26 @ 00:18 )             34.6     05-26 @ 00:18    138  |  103  |  4   ----------------------------<  91  3.4   |  18  |  0.47    Ca    9.0      05-26 @ 00:18  Phos  2.9     05-26 @ 00:18  Mg     1.7     05-26 @ 00:18    TPro  6.3  /  Alb  2.9  /  TBili  0.4  /  DBili  x   /  AST  23  /  ALT  10  /  AlkPhos  117  05-26 @ 00:18    PT/INR - ( 05-26 @ 00:18 )   PT: 12.0 sec;   INR: 1.04 ratio    PTT - ( 05-26 @ 00:18 )  PTT:28.0 sec        MEDICATIONS  (STANDING):  buprenorphine 2 mG/naloxone 0.5 mG SL  Tablet 1 Tablet(s) SubLingual three times a day  cefTRIAXone   IVPB 1000 milliGRAM(s) IV Intermittent every 24 hours  chlorhexidine 2% Cloths 1 Application(s) Topical <User Schedule>  heparin   Injectable 5000 Unit(s) SubCutaneous every 12 hours  lactated ringers. 1000 milliLiter(s) (125 mL/Hr) IV Continuous <Continuous>  polyethylene glycol 3350 17 Gram(s) Oral once  potassium chloride    Tablet ER 40 milliEquivalent(s) Oral once  prenatal multivitamin 1 Tablet(s) Oral daily    MEDICATIONS  (PRN):  acetaminophen     Tablet .. 975 milliGRAM(s) Oral every 6 hours PRN Temp greater or equal to 38.5C (101.3F), Mild Pain (1 - 3), Moderate Pain (4 - 6)  ondansetron Injectable 4 milliGRAM(s) IV Push every 8 hours PRN Nausea and/or Vomiting

## 2025-05-26 NOTE — PROGRESS NOTE ADULT - ASSESSMENT
ASSESSMENT  35yo  @ 28w5d (SONJA ) with hx opioid use disorder p/w right lower back pain and chills at home for 2 days found to be febrile (100.9) and tachycardic (113) admitted to the MICU for sepsis attributed to pyelonephritis.     =====Neurologic=====  Patient is AOx3    =====Cardiovascular=====  #Hypotension likely iso sepsis due to pyelonephritis  Currently not on pressors    Plan  > CTM and titrate pressors for MAP > 65  > Cont. ab as below     =====Pulmonary=====  #AHRF  CXR with clear lungs. on 2L NC    PLAN  > CTM and wean oxygen as tolerated    =====GI=====  - no active issues, regular diet    =====Renal/=====  - no active issues    =====Infectious Disease=====  #Sepsis  WBC was 12 on admission with a fever 100.4 tmax. All in the setting of fevers/chills/back pain.  POCUS ED kidney/bladder: Mild hydronephrosis of the right kidney  CXR with clear lungs, U/A positive    PLAN  > Continue with ceftriaxone (- )  > FU blood cultures   > Continue shock treatment as above     =====Endocrine=====  No concerns presently.     =====Heme/Onc=====  - DVT PPX: Heparin 5000 units subq    =====MICUGeneral=====  Full code    #Opoid use disorder  > pt on suboxone, continue. ASSESSMENT  33yo  @ 28w5d (SONJA ) with hx opioid use disorder p/w right lower back pain and chills at home for 2 days found to be febrile (100.9) and tachycardic (113) admitted to the MICU for sepsis attributed to pyelonephritis.     =====Neurologic=====  Patient is AOx3    =====Cardiovascular=====  #Hypotension likely iso sepsis due to pyelonephritis  Currently not on pressors    Plan  > CTM and titrate pressors for MAP > 65  > Cont. ab as below     =====Pulmonary=====  #AHRF  CXR with clear lungs. weaned to RA satting well     PLAN  > encourage incentive spirometer   > CTM and wean oxygen as tolerated    =====GI=====  - no active issues, regular diet    =====Renal/=====  #Pyelonephritis   + CVA tenderness  Urine culture grew e.coli     PLAN  > abx as in ID section    =====Infectious Disease=====  #Sepsis  WBC was 12 on admission with a fever 100.4 tmax. All in the setting of fevers/chills/back pain.  POCUS ED kidney/bladder: Mild hydronephrosis of the right kidney  CXR with clear lungs, U/A positive  Urine culture grew e.coli     PLAN  > Continue with ceftriaxone (- ) for 7 days   > FU blood cultures       =====Endocrine=====  No concerns presently.     =====Heme/Onc=====  - DVT PPX: Heparin 5000 units subq    =====MICUGeneral=====  Full code    #Opoid use disorder  > pt on suboxone, continue.

## 2025-05-26 NOTE — CHART NOTE - NSCHARTNOTEFT_GEN_A_CORE
MICU Downgrade Note  --------------------------------    Transfer from: MICU  Transfer to:  (  ) Medicine    (  ) Telemetry    (  ) RCU    (  ) Palliative    (  ) Stroke Unit    (X) OB  Accepting Physician: Dr. Garrett    Patient is a 34y old  Female who presents with a chief complaint of Pyelonephritis in pregnancy (26 May 2025 09:28)    HPI:  FABIEN LOZA  34y  Female [phone]    HPI:  35yo  @ 28w5d (SONJA ) p/w right lower back pain and chills at home for 2 days. Pt reports symptoms started on Thursday and have persisted. She has also noticed that her urine appears more cloudy. Her pregnancy course has been uncomplicated thus far. She denies any contractions, LOF, VB and reports fetal movement. She denies SOB/CP, n/v, bowel or urinary changes.       Name of OB Physician: Dr. 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 Denies fibroids, cysts, endometriosis, STI's, Abnormal pap smears   PMHX: Opiod use disorder  PSHx: denies  Meds: Suboxone 0.5mg TID, PNV  All: PNV (hives)  Social History:  Denies smoking use, drug use, alcohol use.         Vital Signs Last 24 Hrs  T(C): 37.9 (25 May 2025 02:44), Max: 38.3 (25 May 2025 00:16)  T(F): 100.2 (25 May 2025 02:44), Max: 100.9 (25 May 2025 00:16)  HR: 106 (25 May 2025 02:44) (106 - 113)  BP: 111/67 (25 May 2025 02:44) (102/63 - 111/67)  BP(mean): --  RR: 18 (25 May 2025 02:44) (18 - 18)  SpO2: 98% (25 May 2025 02:44) (98% - 98%)    Parameters below as of 25 May 2025 02:44  Patient On (Oxygen Delivery Method): room air         (25 May 2025 04:19)      MICU  COURSE:  Patient was admitted to MICU in septic shock due to pyelonephritis iso of pregnancy 28w5d (SONJA ). Urine culture grew e.coli from 2025. Patient has not required IV pressor support and continues to improve. Started on ceftriaxone. Renal US showed no hydronephrosis and probable millimeter-sized RIGHT intrarenal calculus      ASSESSMENT & PLAN:  ASSESSMENT  35yo  @ 28w5d (SONJA ) with hx opioid use disorder p/w right lower back pain and chills at home for 2 days found to be febrile (100.9) and tachycardic (113) admitted to the MICU for sepsis attributed to pyelonephritis.     =====Neurologic=====  Patient is AOx3    =====Cardiovascular=====  #Hypotension likely iso sepsis due to pyelonephritis  Currently not on pressors    Plan  > CTM and titrate pressors for MAP > 65  > Cont. ab as below     =====Pulmonary=====  #AHRF  CXR with clear lungs. weaned to RA satting well     PLAN  > encourage incentive spirometer   > CTM and wean oxygen as tolerated    =====GI=====  - no active issues, regular diet    =====Renal/=====  #Pyelonephritis   + CVA tenderness  Urine culture grew e.coli     PLAN  > abx as in ID section    =====Infectious Disease=====  #Sepsis  WBC was 12 on admission with a fever 100.4 tmax. All in the setting of fevers/chills/back pain.  POCUS ED kidney/bladder: Mild hydronephrosis of the right kidney  CXR with clear lungs, U/A positive  Urine culture grew e.coli     PLAN  > Continue with ceftriaxone (- ) for 7 days   > FU blood cultures       =====Endocrine=====  No concerns presently.     =====Heme/Onc=====  - DVT PPX: Heparin 5000 units subq    =====MICUGeneral=====  Full code    #Opoid use disorder  > pt on suboxone, continue.      FOLLOW UP:  [ ] f/u Ucx for sensitivity; f/u blood culture  [ ] c/w ceftriaxone for 7 days -  [ ] c/w suboxone         REVIEW OF SYSTEMS:  CONSTITUTIONAL: No fever, chills  HEENT:  No blurry vision No sinus or throat pain  NECK: No pain or stiffness  RESPIRATORY: No cough, wheezing, chills or hemoptysis; No shortness of breath  CARDIOVASCULAR: No chest pain, palpitations  GASTROINTESTINAL: No abdominal pain. No nausea, vomiting, or diarrhea  GENITOURINARY: No dysuria  NEUROLOGICAL: No HA, No focal weakness  SKIN: No itching, burning, rashes, or lesions   MUSCULOSKELETAL: back pain     MEDICATIONS:  acetaminophen     Tablet .. 975 milliGRAM(s) Oral every 6 hours PRN  buprenorphine 2 mG/naloxone 0.5 mG SL  Tablet 1 Tablet(s) SubLingual three times a day  cefTRIAXone   IVPB 1000 milliGRAM(s) IV Intermittent every 24 hours  chlorhexidine 2% Cloths 1 Application(s) Topical <User Schedule>  heparin   Injectable 5000 Unit(s) SubCutaneous every 12 hours  ondansetron Injectable 4 milliGRAM(s) IV Push every 8 hours PRN  prenatal multivitamin 1 Tablet(s) Oral daily      VITALS  T(C): 37.9 (25 @ 13:00), Max: 38 (25 @ 08:00)  HR: 104 (25 @ 13:00) (74 - 106)  BP: 88/52 (25 @ 13:00) (77/50 - 120/64)  RR: 22 (25 @ 13:00) (15 - 28)  SpO2: 93% (25 @ 13:00) (90% - 100%)  Wt(kg): --Vital Signs Last 24 Hrs  T(C): 37.9 (26 May 2025 13:00), Max: 38 (26 May 2025 08:00)  T(F): 100.3 (26 May 2025 13:00), Max: 100.4 (26 May 2025 08:00)  HR: 104 (26 May 2025 13:00) (74 - 106)  BP: 88/52 (26 May 2025 13:00) (77/50 - 120/64)  BP(mean): 65 (26 May 2025 13:00) (58 - 84)  RR: 22 (26 May 2025 13:00) (15 - 28)  SpO2: 93% (26 May 2025 13:00) (90% - 100%)    Parameters below as of 25 May 2025 20:00  Patient On (Oxygen Delivery Method): room air        PHYSICAL EXAM:  GENERAL: NAD, lying in bed comfortably  HEAD:  Atraumatic, Normocephalic  EYES: EOMI, PERRLA. No scleral icterus and no conjunctival injection. Tracks me in room  ENT: Moist mucous membranes  NECK: Supple, No JVD  CHEST/LUNG: Clear to auscultation bilaterally; No rales, rhonchi, wheezing, or rubs. Unlabored respirations  HEART: Regular rate and rhythm; No murmurs, rubs, or gallops  ABDOMEN: Gravid belly + CVA tenderness  EXTREMITIES:  2+ Peripheral Pulses, brisk capillary refill. No clubbing, cyanosis, or edema  NERVOUS SYSTEM:  A&Ox3, no focal neurological deficits. CN II-XII grossly intact, but not individually tested.  PSYCHIATRIC: Cooperative. Appropriate mood and affect.  SKIN: Dry, intact, No rashes or lesions      Consultant(s) Notes Reviewed:  [x ] YES  [ ] NO  Care Discussed with Consultants/Other Providers [ x] YES  [ ] NO    LABS:                        8.4    7.13  )-----------( 192      ( 26 May 2025 13:12 )             25.4     05    138  |  103  |  4[L]  ----------------------------<  91  3.4[L]   |  18[L]  |  0.47[L]    Ca    9.0      26 May 2025 00:18  Phos  2.9     -  Mg     1.7     -    TPro  6.3  /  Alb  2.9[L]  /  TBili  0.4  /  DBili  x   /  AST  23  /  ALT  10  /  AlkPhos  117  -    PT/INR - ( 26 May 2025 00:18 )   PT: 12.0 sec;   INR: 1.04 ratio         PTT - ( 26 May 2025 00:18 )  PTT:28.0 sec  Urinalysis Basic - ( 26 May 2025 00:18 )    Color: x / Appearance: x / SG: x / pH: x  Gluc: 91 mg/dL / Ketone: x  / Bili: x / Urobili: x   Blood: x / Protein: x / Nitrite: x   Leuk Esterase: x / RBC: x / WBC x   Sq Epi: x / Non Sq Epi: x / Bacteria: x      CAPILLARY BLOOD GLUCOSE            Urinalysis Basic - ( 26 May 2025 00:18 )    Color: x / Appearance: x / SG: x / pH: x  Gluc: 91 mg/dL / Ketone: x  / Bili: x / Urobili: x   Blood: x / Protein: x / Nitrite: x   Leuk Esterase: x / RBC: x / WBC x   Sq Epi: x / Non Sq Epi: x / Bacteria: x        RADIOLOGY & ADDITIONAL TESTS:    Imaging Personally Reviewed:  [x ] YES  [ ] NO

## 2025-05-27 ENCOUNTER — ASOB RESULT (OUTPATIENT)
Age: 35
End: 2025-05-27

## 2025-05-27 ENCOUNTER — APPOINTMENT (OUTPATIENT)
Dept: ANTEPARTUM | Facility: CLINIC | Age: 35
End: 2025-05-27
Payer: MEDICAID

## 2025-05-27 LAB
-  AMOXICILLIN/CLAVULANIC ACID: SIGNIFICANT CHANGE UP
-  AMPICILLIN/SULBACTAM: SIGNIFICANT CHANGE UP
-  AMPICILLIN: SIGNIFICANT CHANGE UP
-  AZTREONAM: SIGNIFICANT CHANGE UP
-  CEFAZOLIN: SIGNIFICANT CHANGE UP
-  CEFEPIME: SIGNIFICANT CHANGE UP
-  CEFOXITIN: SIGNIFICANT CHANGE UP
-  CEFTRIAXONE: SIGNIFICANT CHANGE UP
-  CEFUROXIME: SIGNIFICANT CHANGE UP
-  CIPROFLOXACIN: SIGNIFICANT CHANGE UP
-  ERTAPENEM: SIGNIFICANT CHANGE UP
-  GENTAMICIN: SIGNIFICANT CHANGE UP
-  IMIPENEM: SIGNIFICANT CHANGE UP
-  LEVOFLOXACIN: SIGNIFICANT CHANGE UP
-  MEROPENEM: SIGNIFICANT CHANGE UP
-  NITROFURANTOIN: SIGNIFICANT CHANGE UP
-  PIPERACILLIN/TAZOBACTAM: SIGNIFICANT CHANGE UP
-  TIGECYCLINE: SIGNIFICANT CHANGE UP
-  TOBRAMYCIN: SIGNIFICANT CHANGE UP
-  TRIMETHOPRIM/SULFAMETHOXAZOLE: SIGNIFICANT CHANGE UP
ALBUMIN SERPL ELPH-MCNC: 2.7 G/DL — LOW (ref 3.3–5)
ALP SERPL-CCNC: 146 U/L — HIGH (ref 40–120)
ALT FLD-CCNC: 10 U/L — SIGNIFICANT CHANGE UP (ref 10–45)
ANION GAP SERPL CALC-SCNC: 12 MMOL/L — SIGNIFICANT CHANGE UP (ref 5–17)
AST SERPL-CCNC: 17 U/L — SIGNIFICANT CHANGE UP (ref 10–40)
BASOPHILS # BLD AUTO: 0.02 K/UL — SIGNIFICANT CHANGE UP (ref 0–0.2)
BASOPHILS NFR BLD AUTO: 0.3 % — SIGNIFICANT CHANGE UP (ref 0–2)
BILIRUB SERPL-MCNC: 0.2 MG/DL — SIGNIFICANT CHANGE UP (ref 0.2–1.2)
BUN SERPL-MCNC: 4 MG/DL — LOW (ref 7–23)
CALCIUM SERPL-MCNC: 8.8 MG/DL — SIGNIFICANT CHANGE UP (ref 8.4–10.5)
CHLORIDE SERPL-SCNC: 104 MMOL/L — SIGNIFICANT CHANGE UP (ref 96–108)
CO2 SERPL-SCNC: 23 MMOL/L — SIGNIFICANT CHANGE UP (ref 22–31)
CREAT SERPL-MCNC: 0.43 MG/DL — LOW (ref 0.5–1.3)
CULTURE RESULTS: ABNORMAL
EGFR: 129 ML/MIN/1.73M2 — SIGNIFICANT CHANGE UP
EGFR: 129 ML/MIN/1.73M2 — SIGNIFICANT CHANGE UP
EOSINOPHIL # BLD AUTO: 0.11 K/UL — SIGNIFICANT CHANGE UP (ref 0–0.5)
EOSINOPHIL NFR BLD AUTO: 1.9 % — SIGNIFICANT CHANGE UP (ref 0–6)
GLUCOSE SERPL-MCNC: 84 MG/DL — SIGNIFICANT CHANGE UP (ref 70–99)
HCT VFR BLD CALC: 28.5 % — LOW (ref 34.5–45)
HGB BLD-MCNC: 9.2 G/DL — LOW (ref 11.5–15.5)
IMM GRANULOCYTES NFR BLD AUTO: 0.7 % — SIGNIFICANT CHANGE UP (ref 0–0.9)
LYMPHOCYTES # BLD AUTO: 1.58 K/UL — SIGNIFICANT CHANGE UP (ref 1–3.3)
LYMPHOCYTES # BLD AUTO: 26.8 % — SIGNIFICANT CHANGE UP (ref 13–44)
MCHC RBC-ENTMCNC: 30.3 PG — SIGNIFICANT CHANGE UP (ref 27–34)
MCHC RBC-ENTMCNC: 32.3 G/DL — SIGNIFICANT CHANGE UP (ref 32–36)
MCV RBC AUTO: 93.8 FL — SIGNIFICANT CHANGE UP (ref 80–100)
METHOD TYPE: SIGNIFICANT CHANGE UP
MONOCYTES # BLD AUTO: 0.54 K/UL — SIGNIFICANT CHANGE UP (ref 0–0.9)
MONOCYTES NFR BLD AUTO: 9.2 % — SIGNIFICANT CHANGE UP (ref 2–14)
NEUTROPHILS # BLD AUTO: 3.6 K/UL — SIGNIFICANT CHANGE UP (ref 1.8–7.4)
NEUTROPHILS NFR BLD AUTO: 61.1 % — SIGNIFICANT CHANGE UP (ref 43–77)
NRBC BLD AUTO-RTO: 0 /100 WBCS — SIGNIFICANT CHANGE UP (ref 0–0)
ORGANISM # SPEC MICROSCOPIC CNT: ABNORMAL
ORGANISM # SPEC MICROSCOPIC CNT: ABNORMAL
PLATELET # BLD AUTO: 259 K/UL — SIGNIFICANT CHANGE UP (ref 150–400)
POTASSIUM SERPL-MCNC: 3.4 MMOL/L — LOW (ref 3.5–5.3)
POTASSIUM SERPL-SCNC: 3.4 MMOL/L — LOW (ref 3.5–5.3)
PROT SERPL-MCNC: 5.3 G/DL — LOW (ref 6–8.3)
RBC # BLD: 3.04 M/UL — LOW (ref 3.8–5.2)
RBC # FLD: 12.8 % — SIGNIFICANT CHANGE UP (ref 10.3–14.5)
SODIUM SERPL-SCNC: 139 MMOL/L — SIGNIFICANT CHANGE UP (ref 135–145)
SPECIMEN SOURCE: SIGNIFICANT CHANGE UP
WBC # BLD: 5.89 K/UL — SIGNIFICANT CHANGE UP (ref 3.8–10.5)
WBC # FLD AUTO: 5.89 K/UL — SIGNIFICANT CHANGE UP (ref 3.8–10.5)

## 2025-05-27 PROCEDURE — 76816 OB US FOLLOW-UP PER FETUS: CPT | Mod: 26

## 2025-05-27 PROCEDURE — 76819 FETAL BIOPHYS PROFIL W/O NST: CPT | Mod: 26,59

## 2025-05-27 PROCEDURE — 99232 SBSQ HOSP IP/OBS MODERATE 35: CPT

## 2025-05-27 RX ADMIN — Medication 1 TABLET(S): at 12:08

## 2025-05-27 RX ADMIN — BUPRENORPHINE HYDROCHLORIDE, NALOXONE HYDROCHLORIDE 1 TABLET(S): 4; 1 FILM, SOLUBLE BUCCAL; SUBLINGUAL at 17:14

## 2025-05-27 RX ADMIN — HEPARIN SODIUM 5000 UNIT(S): 1000 INJECTION INTRAVENOUS; SUBCUTANEOUS at 05:59

## 2025-05-27 RX ADMIN — HEPARIN SODIUM 5000 UNIT(S): 1000 INJECTION INTRAVENOUS; SUBCUTANEOUS at 17:14

## 2025-05-27 RX ADMIN — BUPRENORPHINE HYDROCHLORIDE, NALOXONE HYDROCHLORIDE 1 TABLET(S): 4; 1 FILM, SOLUBLE BUCCAL; SUBLINGUAL at 00:52

## 2025-05-27 RX ADMIN — CEFTRIAXONE 100 MILLIGRAM(S): 500 INJECTION, POWDER, FOR SOLUTION INTRAMUSCULAR; INTRAVENOUS at 03:49

## 2025-05-27 RX ADMIN — BUPRENORPHINE HYDROCHLORIDE, NALOXONE HYDROCHLORIDE 1 TABLET(S): 4; 1 FILM, SOLUBLE BUCCAL; SUBLINGUAL at 09:26

## 2025-05-27 NOTE — PROGRESS NOTE ADULT - NSPROGADDITIONALINFOA_GEN_ALL_CORE
HAMLET Fellow Addendum:   33yo  at 29w0d with hx recurrent UTIs, admittted with pyelonephritis on  with concern for ARDS/sepsis in setting of hypotension/hypoxia, requiring MICU admission. Patient clinically and hemodynamically stable and transferred to antepartum unit yesterday. She is currently afebrile over 24 hours, saturating >95% on RA, feeling well. She remains on empiric IV abx with Ceftriaxone at this time. Ucx positive for >100k E.Coli (sensitivities still pending). Renal ultrasound with non-obstructing millimeter size right renal calculus. Patient reports tylenol helping with pain, overall improving. +CVA tenderness bilaterally on exam, R worse than L. No pain, appears comfortable at rest. Pt denies abd pain cramping/ctx. Reports +FM. Pt with hx of Substance Use Disorder, follows outpatient for over 1 year at [facility] on Suboxone 2mg/0.5mg TID which is ordered here. Fetal status has been reactive and reassuring for GA. For ultrasound evaluation today. Prenatal care with Dr. Luu, who delivers at [facility]. Discussed with patient that plan will be for transition to PO abx when sensitivities return, and if she remains afebrile on PO abx for 24 hours can consider discharge home on PO abx for completion of 14 day treatment course with daily ppx for remainder of pregnancy. Will coordinate follow up with primary OB prior to discharge.   Pt seen with HAMLET Young Attending  HAMLET Arteaga Fellow

## 2025-05-27 NOTE — PROGRESS NOTE ADULT - SUBJECTIVE AND OBJECTIVE BOX
Antepartum Progress Note    S: Patient seen and examined at bedside. No acute events overnight. Reports mild back pain, unchanged from prior. Patient reports good fetal movement and denies LOF, VB, CTX. Denies headache, visual changes, abdominal pain, chest pain, SOB, nausea/vomiting, fevers, chills.     MEDICATIONS  (STANDING):  buprenorphine 2 mG/naloxone 0.5 mG SL  Tablet 1 Tablet(s) SubLingual three times a day  cefTRIAXone   IVPB 1000 milliGRAM(s) IV Intermittent every 24 hours  chlorhexidine 2% Cloths 1 Application(s) Topical <User Schedule>  heparin   Injectable 5000 Unit(s) SubCutaneous every 12 hours  prenatal multivitamin 1 Tablet(s) Oral daily      MEDICATIONS  (PRN):  acetaminophen     Tablet .. 975 milliGRAM(s) Oral every 6 hours PRN Temp greater or equal to 38.5C (101.3F), Mild Pain (1 - 3), Moderate Pain (4 - 6)  ondansetron Injectable 4 milliGRAM(s) IV Push every 8 hours PRN Nausea and/or Vomiting        O:  Vitals:  Vital Signs Last 24 Hrs  T(C): 36.8 (27 May 2025 05:00), Max: 38 (26 May 2025 08:00)  T(F): 98.3 (27 May 2025 05:00), Max: 100.4 (26 May 2025 08:00)  HR: 75 (27 May 2025 05:00) (75 - 106)  BP: 90/60 (27 May 2025 05:00) (77/45 - 120/64)  BP(mean): 69 (26 May 2025 15:00) (65 - 84)  RR: 18 (27 May 2025 05:00) (18 - 28)  SpO2: 97% (27 May 2025 05:00) (90% - 98%)    Parameters below as of 27 May 2025 05:00  Patient On (Oxygen Delivery Method): room air        Physical Exam:  General: NAD  Heart: Clinically well-perfused  Lungs: Breathing comfortably on room air  Abdomen: Soft, gravid, non-tender  Back: no CVA TTP  Extremities: No calf edema/tenderness        Labs:             9.2    5.89  )-----------( 259      ( 05-27 @ 05:26 )             28.5     05-26 @ 00:18    138  |  103  |  4   ----------------------------<  91  3.4   |  18  |  0.47    Ca    9.0      05-26 @ 00:18  Phos  2.9     05-26 @ 00:18  Mg     1.7     05-26 @ 00:18    TPro  6.3  /  Alb  2.9  /  TBili  0.4  /  DBili  x   /  AST  23  /  ALT  10  /  AlkPhos  117  05-26 @ 00:18    PT/INR - ( 05-26 @ 00:18 )   PT: 12.0 sec;   INR: 1.04 ratio    PTT - ( 05-26 @ 00:18 )  PTT:28.0 sec        CAPILLARY BLOOD GLUCOSE

## 2025-05-27 NOTE — SBIRT NOTE ADULT - NSSBIRTDRGPOSREINDET_GEN_A_CORE
Patient agreeable to receiving SBIRT resources for OP and IP treatment, however, reported being connected with services already, declining  assistance for referrals at this time.

## 2025-05-27 NOTE — PROGRESS NOTE ADULT - ASSESSMENT
33yo  @ 29w0d admitted for treatment of pyelonephritis. Patient is s/p MICU admission (-) due to hypotension and hypoxemia to 95%, now s/p IV fluids with improvement in vital signs. Currently receiving IV abx. Fetal status overall reassuring.     #Pyelonephritis in pregnancy  - c/w IV Ceftriaxone (-)  - UCx(): >100k E coli, sensitivities pending  - Renal US(): Mild hydronephrosis of the right kidney    #Hypokalemia  - K:2.9 ->60mEq->3.1->69wMrd7-> 3.4    #Substance Use Disorder  - Suboxone 2 mg TID  - Confirm dose    #Fetal well being  - NST BID    #maternal well being  - Regular diet  - HSQ    Diana Sinks PGY3

## 2025-05-27 NOTE — SBIRT NOTE ADULT - NSSBIRTALCPOSREINDET_GEN_A_CORE
“I see that you report not using any alcohol or other drugs. Most people your age do not and I'm really glad to see you are making this choice for your health and safety.”

## 2025-05-28 LAB
BLD GP AB SCN SERPL QL: NEGATIVE — SIGNIFICANT CHANGE UP
RH IG SCN BLD-IMP: POSITIVE — SIGNIFICANT CHANGE UP

## 2025-05-28 PROCEDURE — 99232 SBSQ HOSP IP/OBS MODERATE 35: CPT

## 2025-05-28 RX ORDER — CEPHALEXIN 250 MG/1
500 CAPSULE ORAL
Refills: 0 | Status: DISCONTINUED | OUTPATIENT
Start: 2025-05-28 | End: 2025-05-29

## 2025-05-28 RX ADMIN — CEFTRIAXONE 100 MILLIGRAM(S): 500 INJECTION, POWDER, FOR SOLUTION INTRAMUSCULAR; INTRAVENOUS at 02:39

## 2025-05-28 RX ADMIN — BUPRENORPHINE HYDROCHLORIDE, NALOXONE HYDROCHLORIDE 1 TABLET(S): 4; 1 FILM, SOLUBLE BUCCAL; SUBLINGUAL at 00:52

## 2025-05-28 RX ADMIN — CEPHALEXIN 500 MILLIGRAM(S): 250 CAPSULE ORAL at 17:47

## 2025-05-28 RX ADMIN — HEPARIN SODIUM 5000 UNIT(S): 1000 INJECTION INTRAVENOUS; SUBCUTANEOUS at 17:47

## 2025-05-28 RX ADMIN — HEPARIN SODIUM 5000 UNIT(S): 1000 INJECTION INTRAVENOUS; SUBCUTANEOUS at 06:11

## 2025-05-28 RX ADMIN — BUPRENORPHINE HYDROCHLORIDE, NALOXONE HYDROCHLORIDE 1 TABLET(S): 4; 1 FILM, SOLUBLE BUCCAL; SUBLINGUAL at 09:17

## 2025-05-28 RX ADMIN — Medication 1 TABLET(S): at 11:44

## 2025-05-28 RX ADMIN — BUPRENORPHINE HYDROCHLORIDE, NALOXONE HYDROCHLORIDE 1 TABLET(S): 4; 1 FILM, SOLUBLE BUCCAL; SUBLINGUAL at 17:46

## 2025-05-28 NOTE — PROGRESS NOTE ADULT - ASSESSMENT
33yo  @ 29w1d admitted for treatment of pyelonephritis. Patient is s/p MICU admission (-) due to hypotension and hypoxia with concern for ARDS/sepsis, now downgraded to antepartum unit and doing well. Patient has been on empiric IV antibiotics pending return of urine culture sensitivities.    #Pyelonephritis in pregnancy  - IV Ceftriaxone (-)  - UCx(): >100k E coli, sensitivities returned overnight  - Renal US(): Mild hydronephrosis of the right kidney  - Switch to PO abx this AM    #Hypokalemia  - K:2.9 ->60mEq->3.1->90tDoa4->>3.4    #Substance Use Disorder  - Follows outpatient for over 1 year at [facility]   - Suboxone 2mg/0.5mg TID     #Fetal well being  - NST BID  - ATU (): vtx, posterior, BPP 8/8, EFW 1418g (59%)  - GBS bacteriuria (3/4)    #Maternal well being  - Regular diet  - HSQ      Plan for d/c today or tomorrow on PO abx for completion of 14 day treatment course with daily ppx for remainder of pregnancy. Patient would like to f/u with primary OB Dr. Gillespie.  Diana Markham PGY3

## 2025-05-28 NOTE — PROGRESS NOTE ADULT - NSPROGADDITIONALINFOA_GEN_ALL_CORE
HAMLET Fellow Addendum:   33yo  at 29w1d with hx recurrent UTIs, admitted with pyelonephritis on  with concern for ARDS/sepsis in setting of hypotension/hypoxia, requiring MICU admission. Patient clinically and hemodynamically stable and transferred to antepartum unit on . She is currently afebrile over 24 hours, saturating >95% on RA, feeling well. Will transition to PO abx today (sensitivities reviewed) with Keflex 250mg QID. Renal ultrasound with non-obstructing millimeter size right renal calculus. Patient reports pain significantly improved. No pain, appears comfortable at rest. Pt denies abd pain cramping/ctx. Reports +FM. Fetus AGA on scan yesterday. Pt with hx of Substance Use Disorder, follows outpatient for over 1 year at [facility] on Suboxone 2mg/0.5mg TID which is ordered here. Fetal status has been reactive and reassuring for GA. Prenatal care with Dr. Luu, who delivers at [facility], scheduled for follow up on . Discussed with patient that plan will be for discharge tomorrow after overnight observation on PO abx. Will be for completion for 14 day treatment course and suppression for remainder of pregnancy.   Pt seen with HAMLET Young Attending  HAMLET Arteaga Fellow

## 2025-05-28 NOTE — PROGRESS NOTE ADULT - SUBJECTIVE AND OBJECTIVE BOX
Antepartum Progress Note    S: Patient seen and examined at bedside. No acute events overnight. No acute complaints. Reports back pain much improved. Patient reports good fetal movement and denies LOF, VB, CTX. Denies headache, visual changes, abdominal pain, chest pain, SOB, nausea/vomiting, fevers, chills.     MEDICATIONS  (STANDING):  buprenorphine 2 mG/naloxone 0.5 mG SL  Tablet 1 Tablet(s) SubLingual three times a day  cefTRIAXone   IVPB 1000 milliGRAM(s) IV Intermittent every 24 hours  heparin   Injectable 5000 Unit(s) SubCutaneous every 12 hours  prenatal multivitamin 1 Tablet(s) Oral daily      MEDICATIONS  (PRN):  acetaminophen     Tablet .. 975 milliGRAM(s) Oral every 6 hours PRN Temp greater or equal to 38.5C (101.3F), Mild Pain (1 - 3), Moderate Pain (4 - 6)  ondansetron Injectable 4 milliGRAM(s) IV Push every 8 hours PRN Nausea and/or Vomiting        O:  Vitals:  Vital Signs Last 24 Hrs  T(C): 36.5 (28 May 2025 06:00), Max: 36.7 (28 May 2025 01:00)  T(F): 97.7 (28 May 2025 06:00), Max: 98.1 (28 May 2025 01:00)  HR: 88 (28 May 2025 06:00) (73 - 88)  BP: 102/66 (28 May 2025 06:00) (81/48 - 102/66)  BP(mean): --  RR: 18 (28 May 2025 06:00) (16 - 18)  SpO2: 94% (28 May 2025 06:00) (94% - 96%)    Parameters below as of 28 May 2025 06:00  Patient On (Oxygen Delivery Method): room air        Physical Exam:  General: NAD  Heart: Clinically well-perfused  Lungs: Breathing comfortably on room air  Abdomen: Soft, gravid, non-tender        Labs:             9.2    5.89  )-----------( 259      ( 05-27 @ 05:26 )             28.5     05-27 @ 09:44    139  |  104  |  4   ----------------------------<  84  3.4   |  23  |  0.43    Ca    8.8      05-27 @ 09:44    TPro  5.3  /  Alb  2.7  /  TBili  0.2  /  DBili  x   /  AST  17  /  ALT  10  /  AlkPhos  146  05-27 @ 09:44    PT/INR - ( 05-26 @ 00:18 )   PT: 12.0 sec;   INR: 1.04 ratio    PTT - ( 05-26 @ 00:18 )  PTT:28.0 sec        CAPILLARY BLOOD GLUCOSE

## 2025-05-29 ENCOUNTER — TRANSCRIPTION ENCOUNTER (OUTPATIENT)
Age: 35
End: 2025-05-29

## 2025-05-29 VITALS
HEART RATE: 81 BPM | SYSTOLIC BLOOD PRESSURE: 108 MMHG | DIASTOLIC BLOOD PRESSURE: 73 MMHG | TEMPERATURE: 98 F | RESPIRATION RATE: 18 BRPM | OXYGEN SATURATION: 97 %

## 2025-05-29 PROCEDURE — 71045 X-RAY EXAM CHEST 1 VIEW: CPT

## 2025-05-29 PROCEDURE — 81001 URINALYSIS AUTO W/SCOPE: CPT

## 2025-05-29 PROCEDURE — 76815 OB US LIMITED FETUS(S): CPT

## 2025-05-29 PROCEDURE — 84132 ASSAY OF SERUM POTASSIUM: CPT

## 2025-05-29 PROCEDURE — 82803 BLOOD GASES ANY COMBINATION: CPT

## 2025-05-29 PROCEDURE — 85027 COMPLETE CBC AUTOMATED: CPT

## 2025-05-29 PROCEDURE — 87186 SC STD MICRODIL/AGAR DIL: CPT

## 2025-05-29 PROCEDURE — 83735 ASSAY OF MAGNESIUM: CPT

## 2025-05-29 PROCEDURE — 87637 SARSCOV2&INF A&B&RSV AMP PRB: CPT

## 2025-05-29 PROCEDURE — 85730 THROMBOPLASTIN TIME PARTIAL: CPT

## 2025-05-29 PROCEDURE — 87086 URINE CULTURE/COLONY COUNT: CPT

## 2025-05-29 PROCEDURE — 85014 HEMATOCRIT: CPT

## 2025-05-29 PROCEDURE — 80053 COMPREHEN METABOLIC PANEL: CPT

## 2025-05-29 PROCEDURE — 85610 PROTHROMBIN TIME: CPT

## 2025-05-29 PROCEDURE — 99285 EMERGENCY DEPT VISIT HI MDM: CPT

## 2025-05-29 PROCEDURE — 0241U: CPT

## 2025-05-29 PROCEDURE — 82330 ASSAY OF CALCIUM: CPT

## 2025-05-29 PROCEDURE — 84100 ASSAY OF PHOSPHORUS: CPT

## 2025-05-29 PROCEDURE — 86850 RBC ANTIBODY SCREEN: CPT

## 2025-05-29 PROCEDURE — 82435 ASSAY OF BLOOD CHLORIDE: CPT

## 2025-05-29 PROCEDURE — 76816 OB US FOLLOW-UP PER FETUS: CPT

## 2025-05-29 PROCEDURE — 84295 ASSAY OF SERUM SODIUM: CPT

## 2025-05-29 PROCEDURE — 86900 BLOOD TYPING SEROLOGIC ABO: CPT

## 2025-05-29 PROCEDURE — 93005 ELECTROCARDIOGRAM TRACING: CPT

## 2025-05-29 PROCEDURE — 99231 SBSQ HOSP IP/OBS SF/LOW 25: CPT

## 2025-05-29 PROCEDURE — 96375 TX/PRO/DX INJ NEW DRUG ADDON: CPT

## 2025-05-29 PROCEDURE — 83605 ASSAY OF LACTIC ACID: CPT

## 2025-05-29 PROCEDURE — 96374 THER/PROPH/DIAG INJ IV PUSH: CPT

## 2025-05-29 PROCEDURE — 87040 BLOOD CULTURE FOR BACTERIA: CPT

## 2025-05-29 PROCEDURE — 36415 COLL VENOUS BLD VENIPUNCTURE: CPT

## 2025-05-29 PROCEDURE — 82947 ASSAY GLUCOSE BLOOD QUANT: CPT

## 2025-05-29 PROCEDURE — 76770 US EXAM ABDO BACK WALL COMP: CPT

## 2025-05-29 PROCEDURE — 76819 FETAL BIOPHYS PROFIL W/O NST: CPT

## 2025-05-29 PROCEDURE — 85025 COMPLETE CBC W/AUTO DIFF WBC: CPT

## 2025-05-29 PROCEDURE — 85018 HEMOGLOBIN: CPT

## 2025-05-29 PROCEDURE — 86901 BLOOD TYPING SEROLOGIC RH(D): CPT

## 2025-05-29 RX ORDER — PRENATAL 136/IRON/FOLIC ACID 27 MG-1 MG
1 TABLET ORAL
Qty: 0 | Refills: 0 | DISCHARGE
Start: 2025-05-29

## 2025-05-29 RX ORDER — POLYETHYLENE GLYCOL 3350 17 G/17G
17 POWDER, FOR SOLUTION ORAL
Qty: 1 | Refills: 2
Start: 2025-05-29 | End: 2025-08-26

## 2025-05-29 RX ORDER — CEPHALEXIN 250 MG/1
1 CAPSULE ORAL
Qty: 36 | Refills: 0
Start: 2025-05-29 | End: 2025-06-06

## 2025-05-29 RX ORDER — NITROFURANTOIN MACROCRYSTAL 100 MG
1 CAPSULE ORAL
Qty: 30 | Refills: 1
Start: 2025-05-29 | End: 2025-07-27

## 2025-05-29 RX ORDER — BUPRENORPHINE HYDROCHLORIDE, NALOXONE HYDROCHLORIDE 4; 1 MG/1; MG/1
1 FILM, SOLUBLE BUCCAL; SUBLINGUAL
Qty: 0 | Refills: 0 | DISCHARGE
Start: 2025-05-29

## 2025-05-29 RX ADMIN — Medication 1 TABLET(S): at 12:16

## 2025-05-29 RX ADMIN — CEPHALEXIN 500 MILLIGRAM(S): 250 CAPSULE ORAL at 00:37

## 2025-05-29 RX ADMIN — BUPRENORPHINE HYDROCHLORIDE, NALOXONE HYDROCHLORIDE 1 TABLET(S): 4; 1 FILM, SOLUBLE BUCCAL; SUBLINGUAL at 09:11

## 2025-05-29 RX ADMIN — HEPARIN SODIUM 5000 UNIT(S): 1000 INJECTION INTRAVENOUS; SUBCUTANEOUS at 09:12

## 2025-05-29 RX ADMIN — CEPHALEXIN 500 MILLIGRAM(S): 250 CAPSULE ORAL at 12:15

## 2025-05-29 RX ADMIN — CEPHALEXIN 500 MILLIGRAM(S): 250 CAPSULE ORAL at 05:59

## 2025-05-29 RX ADMIN — BUPRENORPHINE HYDROCHLORIDE, NALOXONE HYDROCHLORIDE 1 TABLET(S): 4; 1 FILM, SOLUBLE BUCCAL; SUBLINGUAL at 00:37

## 2025-05-29 NOTE — DISCHARGE NOTE ANTEPARTUM - MEDICATION SUMMARY - MEDICATIONS TO STOP TAKING
I will STOP taking the medications listed below when I get home from the hospital:    ondansetron 4 mg oral tablet, disintegrating  -- 1 tab(s) by mouth every 8 hours as needed for  nausea    doxycycline hyclate 100 mg oral tablet  -- 1 tab(s) by mouth 2 times a day    cefpodoxime 200 mg oral tablet  -- 1 tab(s) by mouth 2 times a day MDD: 1 tab    Macrobid 100 mg oral capsule  -- 1 cap(s) by mouth 2 times a day    sucralfate 1 g/10 mL oral suspension  -- 10 milliliter(s) by mouth 3 times a day    ondansetron 4 mg oral tablet, disintegrating  -- 1 tab(s) by mouth 3 times a day as needed for  nausea    Clindamycin (Eqv-Cleocin T) 1% topical gel  -- Apply on skin to affected area once a day

## 2025-05-29 NOTE — PROGRESS NOTE ADULT - ATTENDING COMMENTS
MFM Attending Addendum    Patient seen and evaluated with MFM Team.  H&P reviewed and appreciated, and I agree with the assessment and plan as listed.   at 29 1/7 weeks, now HD4, admitted with pyelonephritis and early urosepsis, begun on ceftriaxone and s/p 1.5 days in the ICU secondary to hypotension and mild hypoxia.  AVSS now, doing well on 3KN, clinically improving with subjectively improved right CVAT; satting 95% and above on room air, with now just over 48 hours afebrile.  Fetal status reassuring, and AGA.  Meyers sensitive E. Coli - agree with keflex as PO regimen for a total of 10-14 days of treatment, followed by suppression for the remainder of the pregnancy with Macrobid.  Blood cultures NGTD now at 72 hours.  I discussed with Dr. Gillespie earlier today, with whom the patient has follow up next week.  Continue hospitalization at this time to make sure PO regimen is satisfactory.    TESS Whitaker MD
R pyelo/UTI, mild R hydro per ER POC sono in setting of pregnancy    Pt clinically stable, Afeb here  Labs improving/wnl  Awaiting final RBUS read  Cont abx, f/u cxs  Suspect R hydro related to infection and physiologic R Hydro of pregnancy as pt has no known h/o stones.    If pt clinically worsens/not improving, would consider MRU vs CT to further image for stone.
MFM Attending Addendum    Patient seen and evaluated with MFM Fellow Dr. Arteaga.  H&P reviewed and appreciated, and I agree with the assessment and plan as listed.   at 29 0/7 weeks, now HD3, admitted with pyelonephritis and early urosepsis, begun on ceftriaxone and s/p 1.5 days in the ICU secondary to hypotension and mild hypoxia.  AVSS now, doing well on 3KN, clinically improving with subjectively improved right CVAT; satting 95% and above on room air, with now just over 24 hours afebrile.  Fetal status reassuring - will check ultrasound today.  Awaiting sensitivities to the E. Coli to return prior to transitioning to PO regimen for a total of 10-14 days of treatment, followed by suppression for the remainder of the pregnancy.  Blood cultures NGTD now at 48 hours. Team contacting Dr. Gillespie to discuss.  Mild hypokalemia responding to repletion.  Continue hospitalization at this time to make sure PO regimen is satisfactory.    TESS Whitaker MD
MFM Attending Addendum    Patient seen and evaluated with MFM Team.  Notes reviewed and appreciated, and I agree with the assessment and plan as listed.   at 29 2/7 weeks, now HD5, admitted with pyelonephritis and early urosepsis, begun on ceftriaxone and s/p 1.5 days in the ICU secondary to hypotension and mild hypoxia.  AVSS now, doing well on 3KN, clinically improving with resolved right CVAT; satting 95% and above on room air, with now just over 72 hours afebrile.  Fetal status reassuring, and AGA.  Meyers sensitive E. Coli - agree with keflex as PO regimen for a total of 10-14 days of treatment, followed by suppression for the remainder of the pregnancy with Macrobid.  Patient has been on Keflex since yesterday and tolerating well. Blood cultures NGTD now at 96 hours.  I discussed with Dr. Gillespie yesterday, with whom the patient has follow up next week.  Stable for discharge today.    TESS Whitaker MD
HAMLET Getachew  Seen in MICU Bed #34    33yo  @ 28w6d (SONJA ) admitted for pyelonephritis. Admitted  to MICU  due to hypotension and hypoxemia and concern for sepsis    Pyelonephritis in pregnancy  -  IV ceftriaxone q 24 hrs(-)  - UCx()->100,000 E. Coli  -Lactate 1.2>>1.4>>>>2.3  -Blood cultures-negative to date  - Renal US(): Mild hydronephrosis of the right kidney.  -Tylenol PRN  -NICU consult appreciated    Hypokalemia  - K:2.9 ->60mEq->3.1->14zRzp8-> 3.4  - labs per MICU    Substance use disorder  -Suboxone 2mg three times daily    R3OB and MFM fellow notes reviewed  Agree with A&P    Patient seen and evaluated by me with the MFM team ([id number]-EhDr. Abdul and MFM fellow-Dr. Messer).  Patient states that she is feeling sore from chills/shivering, flushed and continues to have back pain.  She endorses feeling fatgued and needing to rest. She reports good fetal movement and denies any contractions/cramping.    BP 99/54 P  RR 28 T 38 (100.4) Pulse Ox 90%-96% on RA    -Continue OBS/BR  -Sepsis management per MICU  -When stable will transfer to N    Anyi Boyle MD, MPH

## 2025-05-29 NOTE — DISCHARGE NOTE ANTEPARTUM - PATIENT PORTAL LINK FT
You can access the FollowMyHealth Patient Portal offered by [facility] by registering at the following website: [email or web address]. By joining [employer]’s FollowMyHealth portal, you will also be able to view your health information using other applications (apps) compatible with our system.

## 2025-05-29 NOTE — DISCHARGE NOTE ANTEPARTUM - MEDICATION SUMMARY - MEDICATIONS TO TAKE
I will START or STAY ON the medications listed below when I get home from the hospital:    buprenorphine-naloxone 2 mg-0.5 mg sublingual tablet  -- 1 tab(s) under tongue 3 times a day  -- Indication: For Home med    cephalexin 500 mg oral capsule  -- 1 cap(s) by mouth 4 times a day Finish entire course of antibiotics  -- Indication: For Pyelonephritis    Prenatal Multivitamins with Folic Acid 1 mg oral tablet  -- 1 tab(s) by mouth once a day  -- Indication: For Pregnancy    polyethylene glycol 3350 oral powder for reconstitution  -- 17 gram(s) by mouth once a day  -- Indication: For constipation    Macrobid 100 mg oral capsule  -- 1 cap(s) by mouth once a day Start once you have completed course of Keflex. This is for prophylaxis  -- Indication: For UTI Prophylaxis

## 2025-05-29 NOTE — DISCHARGE NOTE ANTEPARTUM - CARE PROVIDER_API CALL
Brannon Gillespie  Obstetrics and Gynecology  [address], [address]  [place] [address]  Phone: [phone]  Fax: [phone]  Follow Up Time:

## 2025-05-29 NOTE — DISCHARGE NOTE ANTEPARTUM - FINANCIAL ASSISTANCE
[facility] provides services at a reduced cost to those who are determined to be eligible through [facility]’s financial assistance program. Information regarding [facility]’s financial assistance program can be found by going to [email or web address] or by calling [phone].

## 2025-05-29 NOTE — PROGRESS NOTE ADULT - NSPROGADDITIONALINFOA_GEN_ALL_CORE
HAMLET Fellow Addendum:   33yo  at 29w2d admitted with pyelonephritis on  with concern for ARDS/sepsis in setting of hypotension/hypoxia, requiring MICU admission. Patient clinically and hemodynamically stable and transferred to antepartum unit on . She is currently afebrile over 24 hours, saturating >95% on RA, feeling well. Transitioned to PO abx with Keflex 250mg QID. Renal ultrasound with non-obstructing millimeter size right renal calculus. Patient reports pain significantly improved. No pain, appears comfortable at rest. Pt denies abd pain cramping/ctx. Reports +FM. Fetus AGA. Pt with hx of Substance Use Disorder, follows outpatient for over 1 year at [facility] on Suboxone 2mg/0.5mg TID which is ordered here. Fetal status has been reactive and reassuring for GA. Prenatal care with Dr. Luu, who delivers at [facility], scheduled for follow up on . Discussed with patient that plan will be for discharge today. Will be for completion for 14 day treatment course and suppression for remainder of pregnancy. Rx sent to pharmacy.  to help coordinate ride for discharge.   Pt seen with HAMLET Young Attending  HAMLET Arteaga Fellow

## 2025-05-29 NOTE — PROGRESS NOTE ADULT - ASSESSMENT
35yo  at 29w2d admitted for treatment of pyelonephritis. Patient is s/p MICU admission (-) due to hypotension and hypoxia with concern for ARDS/sepsis, now downgraded to antepartum unit and doing well. Patient transitioned to PO antibiotic regimen on HD#4 with continued improvement in symptoms.    #Pyelonephritis in pregnancy  - Keflex (-)  - s/p IV Ceftriaxone (-)  - UCx(): >100k E coli  - Renal US(): Mild hydronephrosis of the right kidney    #Substance Use Disorder  - Follows outpatient for over 1 year at [facility]   - Suboxone 2mg/0.5mg TID     #Fetal well being  - NST BID  - ATU (): vtx, posterior, BPP 8/8, EFW 1418g (59%)  - GBS bacteriuria (3/)    #Maternal well being  - Regular diet  - HSQ      Discharge today on Keflex QID for completion of 14 day treatment course, followed by daily ppx for remainder of pregnancy with Macrobid QD. Patient understanding of plan and all questions answered. Follow-up scheduled with Dr. Gillespie on .  Diana Markham PGY3

## 2025-05-29 NOTE — PROGRESS NOTE ADULT - REASON FOR ADMISSION
Pyelonephritis in pregnancy

## 2025-05-29 NOTE — DISCHARGE NOTE ANTEPARTUM - PLAN OF CARE
Resume normal prenatal care. Please call your doctor with any questions or concerns. Please report back to the hospital if you experience fevers, chills, nausea, vomiting, painful contractions, vaginal bleeding, loss of fluid or if you experience decreased fetal movement. Finish course of Keflex.  Start taking Macrobid daily after you finish Keflex.  Drink 2 liters of water each day.

## 2025-05-29 NOTE — DISCHARGE NOTE ANTEPARTUM - HOSPITAL COURSE
35yo  at 29w2d admitted for treatment of pyelonephritis. Patient is s/p MICU admission (-) due to hypotension and hypoxia with concern for ARDS/sepsis, now downgraded to antepartum unit and doing well. Urine culture resulted  >100K E.Coli. Patient transitioned to PO antibiotic regimen on HD#4 with continued improvement in symptoms.

## 2025-05-29 NOTE — PROGRESS NOTE ADULT - SUBJECTIVE AND OBJECTIVE BOX
Antepartum Progress Note    S: Patient seen and examined at bedside. No acute events overnight. No acute complaints. Back pain has resolved. Patient reports good fetal movement and denies LOF, VB, CTX. Denies dysuria, headache, visual changes, abdominal pain, chest pain, SOB, nausea/vomiting, fevers, chills.     MEDICATIONS  (STANDING):  buprenorphine 2 mG/naloxone 0.5 mG SL  Tablet 1 Tablet(s) SubLingual three times a day  cephalexin 500 milliGRAM(s) Oral four times a day  heparin   Injectable 5000 Unit(s) SubCutaneous every 12 hours  prenatal multivitamin 1 Tablet(s) Oral daily      MEDICATIONS  (PRN):  acetaminophen     Tablet .. 975 milliGRAM(s) Oral every 6 hours PRN Temp greater or equal to 38.5C (101.3F), Mild Pain (1 - 3), Moderate Pain (4 - 6)  ondansetron Injectable 4 milliGRAM(s) IV Push every 8 hours PRN Nausea and/or Vomiting        O:  Vitals:  Vital Signs Last 24 Hrs  T(C): 36.3 (29 May 2025 06:00), Max: 36.7 (28 May 2025 08:54)  T(F): 97.4 (29 May 2025 06:00), Max: 98.1 (28 May 2025 08:54)  HR: 80 (29 May 2025 06:00) (80 - 85)  BP: 98/61 (29 May 2025 06:00) (90/59 - 98/61)  BP(mean): --  RR: 18 (29 May 2025 06:00) (18 - 18)  SpO2: 95% (29 May 2025 06:00) (94% - 99%)    Parameters below as of 29 May 2025 06:00  Patient On (Oxygen Delivery Method): room air        Physical Exam:  General: NAD  Heart: Clinically well-perfused  Lungs: Breathing comfortably on room air  Abdomen: Soft, gravid, non-tender        Labs:  05-27 @ 09:44    139  |  104  |  4   ----------------------------<  84  3.4   |  23  |  0.43    Ca    8.8      05-27 @ 09:44    TPro  5.3  /  Alb  2.7  /  TBili  0.2  /  DBili  x   /  AST  17  /  ALT  10  /  AlkPhos  146  05-27 @ 09:44            CAPILLARY BLOOD GLUCOSE

## 2025-05-29 NOTE — DISCHARGE NOTE ANTEPARTUM - CARE PLAN
1 Principal Discharge DX:	Pyelonephritis  Assessment and plan of treatment:	Finish course of Keflex.  Start taking Macrobid daily after you finish Keflex.  Drink 2 liters of water each day.  Secondary Diagnosis:	Supervision of normal pregnancy, antepartum  Assessment and plan of treatment:	Resume normal prenatal care. Please call your doctor with any questions or concerns. Please report back to the hospital if you experience fevers, chills, nausea, vomiting, painful contractions, vaginal bleeding, loss of fluid or if you experience decreased fetal movement.

## 2025-05-30 ENCOUNTER — NON-APPOINTMENT (OUTPATIENT)
Age: 35
End: 2025-05-30

## 2025-05-30 PROBLEM — Z3A.30 30 WEEKS GESTATION OF PREGNANCY: Status: ACTIVE | Noted: 2025-05-30

## 2025-05-30 LAB
CULTURE RESULTS: SIGNIFICANT CHANGE UP
CULTURE RESULTS: SIGNIFICANT CHANGE UP
SPECIMEN SOURCE: SIGNIFICANT CHANGE UP
SPECIMEN SOURCE: SIGNIFICANT CHANGE UP

## 2025-06-04 ENCOUNTER — APPOINTMENT (OUTPATIENT)
Dept: OBGYN | Facility: CLINIC | Age: 35
End: 2025-06-04
Payer: MEDICAID

## 2025-06-04 ENCOUNTER — LABORATORY RESULT (OUTPATIENT)
Age: 35
End: 2025-06-04

## 2025-06-04 VITALS
SYSTOLIC BLOOD PRESSURE: 110 MMHG | BODY MASS INDEX: 31.34 KG/M2 | HEART RATE: 91 BPM | DIASTOLIC BLOOD PRESSURE: 70 MMHG | WEIGHT: 183.6 LBS | RESPIRATION RATE: 14 BRPM | HEIGHT: 64 IN | OXYGEN SATURATION: 97 %

## 2025-06-04 DIAGNOSIS — N10 ACUTE TUBULO-INTERSTITIAL NEPHRITIS: ICD-10-CM

## 2025-06-04 DIAGNOSIS — Z23 ENCOUNTER FOR IMMUNIZATION: ICD-10-CM

## 2025-06-04 DIAGNOSIS — F19.11 OTHER PSYCHOACTIVE SUBSTANCE ABUSE, IN REMISSION: ICD-10-CM

## 2025-06-04 DIAGNOSIS — O09.529 SUPERVISION OF ELDERLY MULTIGRAVIDA, UNSPECIFIED TRIMESTER: ICD-10-CM

## 2025-06-04 DIAGNOSIS — Z3A.30 30 WEEKS GESTATION OF PREGNANCY: ICD-10-CM

## 2025-06-04 PROCEDURE — 99213 OFFICE O/P EST LOW 20 MIN: CPT | Mod: TH,25

## 2025-06-04 PROCEDURE — 90471 IMMUNIZATION ADMIN: CPT

## 2025-06-04 PROCEDURE — 90715 TDAP VACCINE 7 YRS/> IM: CPT

## 2025-06-05 LAB
BASOPHILS # BLD AUTO: 0.03 K/UL
BASOPHILS NFR BLD AUTO: 0.3 %
EOSINOPHIL # BLD AUTO: 0.11 K/UL
EOSINOPHIL NFR BLD AUTO: 1.1 %
HCT VFR BLD CALC: 32 %
HGB BLD-MCNC: 10 G/DL
IMM GRANULOCYTES NFR BLD AUTO: 0.5 %
LYMPHOCYTES # BLD AUTO: 1.81 K/UL
LYMPHOCYTES NFR BLD AUTO: 18.1 %
MAN DIFF?: NORMAL
MCHC RBC-ENTMCNC: 30.3 PG
MCHC RBC-ENTMCNC: 31.3 G/DL
MCV RBC AUTO: 97 FL
MONOCYTES # BLD AUTO: 0.52 K/UL
MONOCYTES NFR BLD AUTO: 5.2 %
NEUTROPHILS # BLD AUTO: 7.5 K/UL
NEUTROPHILS NFR BLD AUTO: 74.8 %
PLATELET # BLD AUTO: 387 K/UL
RBC # BLD: 3.3 M/UL
RBC # FLD: 12.9 %
WBC # FLD AUTO: 10.02 K/UL

## 2025-06-06 ENCOUNTER — APPOINTMENT (OUTPATIENT)
Dept: OBGYN | Facility: CLINIC | Age: 35
End: 2025-06-06

## 2025-06-13 ENCOUNTER — APPOINTMENT (OUTPATIENT)
Dept: OBGYN | Facility: CLINIC | Age: 35
End: 2025-06-13

## 2025-06-23 ENCOUNTER — APPOINTMENT (OUTPATIENT)
Dept: OBGYN | Facility: CLINIC | Age: 35
End: 2025-06-23

## 2025-06-27 ENCOUNTER — APPOINTMENT (OUTPATIENT)
Dept: OBGYN | Facility: CLINIC | Age: 35
End: 2025-06-27
Payer: MEDICAID

## 2025-06-27 VITALS
OXYGEN SATURATION: 97 % | WEIGHT: 177.6 LBS | SYSTOLIC BLOOD PRESSURE: 112 MMHG | HEIGHT: 64 IN | HEART RATE: 101 BPM | RESPIRATION RATE: 14 BRPM | DIASTOLIC BLOOD PRESSURE: 72 MMHG | BODY MASS INDEX: 30.32 KG/M2

## 2025-06-27 PROBLEM — B00.9 HERPES SIMPLEX VIRUS (HSV) INFECTION: Status: ACTIVE | Noted: 2025-01-27

## 2025-06-27 PROBLEM — Z3A.30 30 WEEKS GESTATION OF PREGNANCY: Status: RESOLVED | Noted: 2025-05-30 | Resolved: 2025-06-27

## 2025-06-27 PROBLEM — Z3A.33 33 WEEKS GESTATION OF PREGNANCY: Status: ACTIVE | Noted: 2025-06-25 | Resolved: 2025-07-09

## 2025-06-27 PROBLEM — A60.09 GENITAL HERPES IN WOMEN: Status: ACTIVE | Noted: 2025-06-27

## 2025-06-27 PROBLEM — L74.0 HEAT RASH: Status: ACTIVE | Noted: 2025-06-27

## 2025-06-27 PROCEDURE — 99213 OFFICE O/P EST LOW 20 MIN: CPT | Mod: TH

## 2025-06-27 RX ORDER — CLOTRIMAZOLE AND BETAMETHASONE DIPROPIONATE 10; .5 MG/G; MG/G
1-0.05 CREAM TOPICAL TWICE DAILY
Qty: 1 | Refills: 1 | Status: ACTIVE | COMMUNITY
Start: 2025-06-27 | End: 1900-01-01

## 2025-06-27 RX ORDER — ONDANSETRON 8 MG/1
8 TABLET, ORALLY DISINTEGRATING ORAL
Qty: 3 | Refills: 0 | Status: ACTIVE | COMMUNITY
Start: 2025-06-27 | End: 1900-01-01

## 2025-06-27 RX ORDER — VALACYCLOVIR 500 MG/1
500 TABLET, FILM COATED ORAL TWICE DAILY
Qty: 60 | Refills: 1 | Status: ACTIVE | COMMUNITY
Start: 2025-06-27 | End: 1900-01-01

## 2025-06-30 ENCOUNTER — ASOB RESULT (OUTPATIENT)
Age: 35
End: 2025-06-30

## 2025-06-30 ENCOUNTER — APPOINTMENT (OUTPATIENT)
Dept: ANTEPARTUM | Facility: CLINIC | Age: 35
End: 2025-06-30
Payer: MEDICAID

## 2025-06-30 PROCEDURE — 76819 FETAL BIOPHYS PROFIL W/O NST: CPT

## 2025-06-30 PROCEDURE — 76816 OB US FOLLOW-UP PER FETUS: CPT

## 2025-07-18 ENCOUNTER — APPOINTMENT (OUTPATIENT)
Dept: ANTEPARTUM | Facility: CLINIC | Age: 35
End: 2025-07-18

## 2025-07-21 ENCOUNTER — NON-APPOINTMENT (OUTPATIENT)
Age: 35
End: 2025-07-21

## 2025-07-22 ENCOUNTER — NON-APPOINTMENT (OUTPATIENT)
Age: 35
End: 2025-07-22

## 2025-07-23 ENCOUNTER — NON-APPOINTMENT (OUTPATIENT)
Age: 35
End: 2025-07-23

## 2025-07-25 ENCOUNTER — APPOINTMENT (OUTPATIENT)
Dept: ANTEPARTUM | Facility: CLINIC | Age: 35
End: 2025-07-25

## 2025-07-31 ENCOUNTER — NON-APPOINTMENT (OUTPATIENT)
Age: 35
End: 2025-07-31

## 2025-08-01 ENCOUNTER — APPOINTMENT (OUTPATIENT)
Dept: ANTEPARTUM | Facility: CLINIC | Age: 35
End: 2025-08-01

## 2025-08-08 ENCOUNTER — APPOINTMENT (OUTPATIENT)
Dept: ANTEPARTUM | Facility: CLINIC | Age: 35
End: 2025-08-08